# Patient Record
Sex: FEMALE | Race: WHITE | Employment: OTHER | URBAN - METROPOLITAN AREA
[De-identification: names, ages, dates, MRNs, and addresses within clinical notes are randomized per-mention and may not be internally consistent; named-entity substitution may affect disease eponyms.]

---

## 2017-04-10 PROBLEM — E66.9 MILD OBESITY: Status: ACTIVE | Noted: 2017-04-10

## 2018-02-28 PROBLEM — I71.40 ANEURYSM OF ABDOMINAL AORTA: Status: ACTIVE | Noted: 2018-02-28

## 2018-06-06 ENCOUNTER — OFFICE VISIT (OUTPATIENT)
Dept: CARDIOLOGY | Facility: CLINIC | Age: 78
End: 2018-06-06
Attending: INTERNAL MEDICINE
Payer: MEDICARE

## 2018-06-06 VITALS
HEART RATE: 82 BPM | BODY MASS INDEX: 32.44 KG/M2 | DIASTOLIC BLOOD PRESSURE: 71 MMHG | HEIGHT: 69 IN | SYSTOLIC BLOOD PRESSURE: 130 MMHG | WEIGHT: 219 LBS

## 2018-06-06 DIAGNOSIS — I10 ESSENTIAL HYPERTENSION: ICD-10-CM

## 2018-06-06 DIAGNOSIS — E66.9 MILD OBESITY: ICD-10-CM

## 2018-06-06 DIAGNOSIS — E78.00 HYPERCHOLESTEROLEMIA: ICD-10-CM

## 2018-06-06 DIAGNOSIS — K21.9 GASTROESOPHAGEAL REFLUX DISEASE WITHOUT ESOPHAGITIS: ICD-10-CM

## 2018-06-06 DIAGNOSIS — R07.2 PRECORDIAL PAIN: ICD-10-CM

## 2018-06-06 DIAGNOSIS — I71.40 ABDOMINAL AORTIC ANEURYSM (AAA) WITHOUT RUPTURE: ICD-10-CM

## 2018-06-06 DIAGNOSIS — I50.32 HEART FAILURE, DIASTOLIC, CHRONIC: ICD-10-CM

## 2018-06-06 PROCEDURE — 99214 OFFICE O/P EST MOD 30 MIN: CPT | Mod: S$GLB,,, | Performed by: INTERNAL MEDICINE

## 2018-06-06 PROCEDURE — 3078F DIAST BP <80 MM HG: CPT | Mod: CPTII,S$GLB,, | Performed by: INTERNAL MEDICINE

## 2018-06-06 PROCEDURE — 3075F SYST BP GE 130 - 139MM HG: CPT | Mod: CPTII,S$GLB,, | Performed by: INTERNAL MEDICINE

## 2018-06-06 RX ORDER — LISINOPRIL 10 MG/1
10 TABLET ORAL DAILY
Qty: 90 TABLET | Refills: 3 | Status: SHIPPED | OUTPATIENT
Start: 2018-06-06 | End: 2018-12-05 | Stop reason: SDUPTHER

## 2018-06-06 RX ORDER — ASPIRIN 81 MG/1
81 TABLET ORAL DAILY
Qty: 90 TABLET | Refills: 3 | COMMUNITY
Start: 2018-06-06 | End: 2018-12-05 | Stop reason: SDUPTHER

## 2018-06-06 RX ORDER — HYDROCHLOROTHIAZIDE 12.5 MG/1
12.5 CAPSULE ORAL DAILY
Qty: 90 CAPSULE | Refills: 3 | Status: SHIPPED | OUTPATIENT
Start: 2018-06-06 | End: 2018-12-05 | Stop reason: SDUPTHER

## 2018-06-06 RX ORDER — SPIRONOLACTONE 25 MG/1
25 TABLET ORAL DAILY
Qty: 90 TABLET | Refills: 3 | Status: SHIPPED | OUTPATIENT
Start: 2018-06-06 | End: 2018-12-05 | Stop reason: SDUPTHER

## 2018-06-06 NOTE — PROGRESS NOTES
Subjective:     Shanice Grewal is a 78 y.o. female with hypertension and hypercholesterolemia. She is mildly obese. She has hypertensive heart disease with moderate diastolic dysfunction. She had an episode of chest pain when shopping on 11/14/2013. An ambulance was called for and she was checked at her home. Advised to follow up with her doctor as outpatient. She had a Stress Echo in 11/2013 that was fine. She was diagnosed with esophageal reflux disease in 4/2014. After she began a proton pump inhibitor and sucralfate her epigastric pain resolved. In 5/2015 she was seen at [facility] ER for atypical chest pain. She underwent a Stress Echocardiogram in 6/2015 during which she was able to exercise for 6:00 minutes. There was no chest pain and the ECG was negative. The Echo was negative as well except for a mildly sigmoid septum. The chest pain has since essentially resolved. In 5/2018 she had gynecological surgery. No exertional chest pain or exertional dyspnea. No palpitations or weak spells. No bleeding. Feeling well overall.    Congestive Heart Failure   Presents for follow-up visit. The disease course has been stable. Pertinent negatives include no abdominal pain, chest pain, chest pressure, claudication, edema, fatigue, muscle weakness, near-syncope, nocturia, orthopnea, palpitations, paroxysmal nocturnal dyspnea, shortness of breath or unexpected weight change. The symptoms have been stable.   Chest Pain    This is a chronic problem. The current episode started more than 1 year ago. The problem occurs rarely. The problem has been unchanged. Pertinent negatives include no abdominal pain, back pain, claudication, cough, diaphoresis, dizziness, exertional chest pressure, fever, headaches, hemoptysis, irregular heartbeat, leg pain, lower extremity edema, malaise/fatigue, nausea, near-syncope, numbness, orthopnea, palpitations, PND, shortness of breath, sputum production, syncope, vomiting or weakness. The pain is  aggravated by nothing.   Her past medical history is significant for CHF, hyperlipidemia and hypertension.   Pertinent negatives for past medical history include no diabetes and no muscle weakness.   Hypertension   This is a chronic problem. The current episode started more than 1 year ago. The problem is unchanged. The problem is controlled (usually 120-130/70-80 mmHg at home). Pertinent negatives include no anxiety, blurred vision, chest pain, headaches, malaise/fatigue, neck pain, orthopnea, palpitations, peripheral edema, PND, shortness of breath or sweats. There is no history of chronic renal disease.   Hyperlipidemia   This is a chronic problem. The current episode started more than 1 year ago. The problem is controlled. Recent lipid tests were reviewed and are normal. Exacerbating diseases include obesity. She has no history of chronic renal disease, diabetes, hypothyroidism, liver disease or nephrotic syndrome. Pertinent negatives include no chest pain, focal sensory loss, focal weakness, leg pain, myalgias or shortness of breath.       Review of Systems   Constitution: Negative for diaphoresis, fatigue, fever, weakness, malaise/fatigue and unexpected weight change.   HENT: Negative for nosebleeds.    Eyes: Negative for blurred vision, pain, vision loss in left eye and vision loss in right eye.   Cardiovascular: Negative for chest pain, claudication, dyspnea on exertion, irregular heartbeat, leg swelling, near-syncope, orthopnea, palpitations, paroxysmal nocturnal dyspnea and syncope.   Respiratory: Negative for cough, hemoptysis, shortness of breath, sleep disturbances due to breathing, sputum production and wheezing.    Endocrine: Negative for cold intolerance and heat intolerance.   Hematologic/Lymphatic: Negative for bleeding problem. Does not bruise/bleed easily.   Skin: Negative for color change and rash.   Musculoskeletal: Positive for arthritis. Negative for back pain, falls, joint pain, muscle  weakness, myalgias and neck pain.   Gastrointestinal: Negative for abdominal pain, heartburn, hematemesis, hematochezia, hemorrhoids, jaundice, melena, nausea and vomiting.   Genitourinary: Negative for dysuria, hematuria and nocturia.   Neurological: Negative for dizziness, focal weakness, headaches, light-headedness, loss of balance, numbness and vertigo.   Psychiatric/Behavioral: Negative for altered mental status, depression and memory loss. The patient is not nervous/anxious.    Allergic/Immunologic: Negative for hives and persistent infections.       Current Outpatient Prescriptions on File Prior to Visit   Medication Sig Dispense Refill    aspirin (ECOTRIN) 81 MG EC tablet Take 1 tablet (81 mg total) by mouth once daily. 90 tablet 3    atorvastatin (LIPITOR) 80 MG tablet Take 1 tablet (80 mg total) by mouth once daily. 90 tablet 3    ergocalciferol (VITAMIN D2) 50,000 unit Cap Take 50,000 Units by mouth every 7 days.      ezetimibe (ZETIA) 10 mg tablet Take 1 tablet (10 mg total) by mouth once daily. 90 tablet 3    fish oil-omega-3 fatty acids 300-1,000 mg capsule Take 2 g by mouth once daily.      fluticasone (FLONASE) 50 mcg/actuation nasal spray       FLUVIRIN [id number] 45 mcg (15 mcg x 3)/0.5 mL Susp       gabapentin (NEURONTIN) 100 MG capsule Take 100 mg by mouth 3 (three) times daily.      hydroCHLOROthiazide (MICROZIDE) 12.5 mg capsule Take 1 capsule (12.5 mg total) by mouth once daily. 90 capsule 3    lisinopril 10 MG tablet Take 1 tablet (10 mg total) by mouth once daily. 90 tablet 3    metoclopramide HCl (REGLAN) 10 MG tablet       omeprazole (PRILOSEC) 20 MG capsule Take 20 mg by mouth once daily.      PATANOL 0.1 % ophthalmic solution       spironolactone (ALDACTONE) 25 MG tablet Take 1 tablet (25 mg total) by mouth once daily. 90 tablet 3    sucralfate (CARAFATE) 1 gram tablet Take 1 g by mouth 4 (four) times daily before meals and nightly.        No current facility-administered  "medications on file prior to visit.        /71   Pulse 82   Ht 5' 9" (1.753 m)   Wt 99.3 kg (219 lb)   BMI 32.34 kg/m²       Objective:     Physical Exam   Constitutional: She is oriented to person, place, and time. She appears well-developed and well-nourished. She does not appear ill. No distress.   HENT:   Head: Normocephalic and atraumatic.   Nose: Nose normal.   Eyes: Right eye exhibits no discharge. Left eye exhibits no discharge. Right conjunctiva has no hemorrhage. Left conjunctiva has no hemorrhage. Right pupil is round. Left pupil is round. Pupils are equal.   Neck: Neck supple. No JVD present. Carotid bruit is not present. No thyromegaly present.   Cardiovascular: Normal rate, regular rhythm, S1 normal and S2 normal.   No extrasystoles are present. PMI is not displaced.  Exam reveals gallop and S4. Exam reveals no S3.    Murmur heard.   Harsh midsystolic murmur is present with a grade of 2/6  at the upper right sternal border  Pulses:       Radial pulses are 2+ on the right side, and 2+ on the left side.        Dorsalis pedis pulses are 2+ on the right side, and 2+ on the left side.        Posterior tibial pulses are 2+ on the right side, and 2+ on the left side.   Pulmonary/Chest: Effort normal and breath sounds normal.   Abdominal: Soft. Normal appearance. There is no hepatosplenomegaly. There is no tenderness.   Musculoskeletal:        Right ankle: She exhibits no swelling, no ecchymosis and no deformity.        Left ankle: She exhibits no swelling, no ecchymosis and no deformity.   Lymphadenopathy:        Head (right side): No submandibular adenopathy present.        Head (left side): No submandibular adenopathy present.     She has no cervical adenopathy.   Neurological: She is alert and oriented to person, place, and time. She is not disoriented. No cranial nerve deficit.   Skin: Skin is warm, dry and intact. No rash noted. She is not diaphoretic. Nails show no clubbing.   Psychiatric: She " has a normal mood and affect. Her speech is normal and behavior is normal. Judgment and thought content normal. Cognition and memory are normal.       Assessment:      1. Heart failure, diastolic, chronic    2. Precordial pain    3. Abdominal aortic aneurysm (AAA) without rupture    4. Essential hypertension    5. Hypercholesterolemia    6. Mild obesity    7. Gastroesophageal reflux disease without esophagitis        Plan:     1. Heart Failure, Diastolic, Chronic   2/21/2011: Echo: Moderate left ventricular hypertrophy. Moderate diastolic dysfunction. Mild to moderate mitral regurgitation.   On lisinopril 10 mg Q24, spironolactone 25 mg Q24 and hctz 12.5 mg Q24.   Well compensated.    2. Chest Pain   11/15/2013: ECG: NSR. Left axis deviation.   11/25/2013: Stress Echo: 6:30 min. Negative.   5/19/2015: Seen at [facility] ER for atypical chest pain.   6/2/2015: Stress Echo: 6:00 min. No CP. ECG negative. Echo negative. Mildly sigmoid septum.    3/15/2016: Recurrent atypical chest pain.   Appears chest pain was non cardiac in origin.   Mostly resolved.   Reassurance.     3. Hypertension   2000: Diagnosed.   On lisinopril 10 mg Q24, spironolactone 25 mg Q24 and hctz 12.5 mg Q24.   Keeping log at home.    Well controlled.     4. Hypercholesterolemia   2005: Began statin.   On atorvastatin 80 mg Q24 and ezetimide 10 mg Q24.   Tells me well controlled.    5. Mild Obesity   4/10/2017: Weight 98 kg. BMI 32.   Encouraged to lose weight.    6. Gastroesophageal Reflux Disease    4/2014: Diagnosed.   On omeprazole 20 mg Q24.   Seldom bothers her.    7. Primary Care   Dr. Doe Bingham.    F/u 6 months.    Mira Mcknight M.D.

## 2018-12-05 ENCOUNTER — OFFICE VISIT (OUTPATIENT)
Dept: CARDIOLOGY | Facility: CLINIC | Age: 78
End: 2018-12-05
Attending: INTERNAL MEDICINE
Payer: MEDICARE

## 2018-12-05 VITALS
HEART RATE: 75 BPM | BODY MASS INDEX: 32.44 KG/M2 | DIASTOLIC BLOOD PRESSURE: 72 MMHG | WEIGHT: 219 LBS | SYSTOLIC BLOOD PRESSURE: 131 MMHG | HEIGHT: 69 IN

## 2018-12-05 DIAGNOSIS — E66.9 MILD OBESITY: ICD-10-CM

## 2018-12-05 DIAGNOSIS — I10 ESSENTIAL HYPERTENSION: ICD-10-CM

## 2018-12-05 DIAGNOSIS — I50.32 HEART FAILURE, DIASTOLIC, CHRONIC: ICD-10-CM

## 2018-12-05 DIAGNOSIS — I71.40 ABDOMINAL AORTIC ANEURYSM (AAA) WITHOUT RUPTURE: ICD-10-CM

## 2018-12-05 DIAGNOSIS — Z87.898 HISTORY OF CHEST PAIN: ICD-10-CM

## 2018-12-05 DIAGNOSIS — K21.9 GASTROESOPHAGEAL REFLUX DISEASE WITHOUT ESOPHAGITIS: ICD-10-CM

## 2018-12-05 DIAGNOSIS — R07.2 PRECORDIAL PAIN: ICD-10-CM

## 2018-12-05 DIAGNOSIS — E78.00 HYPERCHOLESTEROLEMIA: ICD-10-CM

## 2018-12-05 PROCEDURE — 3078F DIAST BP <80 MM HG: CPT | Mod: CPTII,S$GLB,, | Performed by: INTERNAL MEDICINE

## 2018-12-05 PROCEDURE — 99214 OFFICE O/P EST MOD 30 MIN: CPT | Mod: S$GLB,,, | Performed by: INTERNAL MEDICINE

## 2018-12-05 PROCEDURE — 3075F SYST BP GE 130 - 139MM HG: CPT | Mod: CPTII,S$GLB,, | Performed by: INTERNAL MEDICINE

## 2018-12-05 PROCEDURE — 93000 ELECTROCARDIOGRAM COMPLETE: CPT | Mod: S$GLB,,, | Performed by: INTERNAL MEDICINE

## 2018-12-05 RX ORDER — SPIRONOLACTONE 25 MG/1
25 TABLET ORAL DAILY
Qty: 90 TABLET | Refills: 3 | Status: SHIPPED | OUTPATIENT
Start: 2018-12-05 | End: 2019-06-01

## 2018-12-05 RX ORDER — HYDROCHLOROTHIAZIDE 12.5 MG/1
12.5 CAPSULE ORAL DAILY
Qty: 90 CAPSULE | Refills: 3 | Status: SHIPPED | OUTPATIENT
Start: 2018-12-05 | End: 2019-05-31

## 2018-12-05 RX ORDER — ATORVASTATIN CALCIUM 80 MG/1
80 TABLET, FILM COATED ORAL DAILY
Qty: 90 TABLET | Refills: 3 | Status: SHIPPED | OUTPATIENT
Start: 2018-12-05 | End: 2019-06-05 | Stop reason: SDUPTHER

## 2018-12-05 RX ORDER — LISINOPRIL 10 MG/1
10 TABLET ORAL DAILY
Qty: 90 TABLET | Refills: 3 | Status: SHIPPED | OUTPATIENT
Start: 2018-12-05 | End: 2019-06-05 | Stop reason: SDUPTHER

## 2018-12-05 RX ORDER — EZETIMIBE 10 MG/1
10 TABLET ORAL DAILY
Qty: 90 TABLET | Refills: 3 | Status: SHIPPED | OUTPATIENT
Start: 2018-12-05 | End: 2019-06-05 | Stop reason: SDUPTHER

## 2018-12-05 RX ORDER — ASPIRIN 81 MG/1
81 TABLET ORAL DAILY
Qty: 90 TABLET | Refills: 3 | COMMUNITY
Start: 2018-12-05 | End: 2019-06-05 | Stop reason: SDUPTHER

## 2018-12-05 NOTE — PROGRESS NOTES
Subjective:     Shanice Grewal is a 78 y.o. female with hypertension and hypercholesterolemia. She is mildly obese. She has hypertensive heart disease with moderate diastolic dysfunction. She had an episode of chest pain when shopping on 11/14/2013. An ambulance was called for and she was checked at her home. Advised to follow up with her doctor as outpatient. She had a Stress Echo in 11/2013 that was fine. She was diagnosed with esophageal reflux disease in 4/2014. After she began a proton pump inhibitor and sucralfate her epigastric pain resolved. In 5/2015 she was seen at [facility] ER for atypical chest pain. She underwent a Stress Echocardiogram in 6/2015 during which she was able to exercise for 6:00 minutes. There was no chest pain and the ECG was negative. The Echo was negative as well except for a mildly sigmoid septum. The chest pain has since resolved. In 5/2018 she had gynecological surgery. No exertional chest pain or exertional dyspnea. No palpitations or weak spells. Occasional a warm feeling in the right calf that can come on at essentially any time and dose not appear related to activities.  No bleeding. Feeling well overall.      Congestive Heart Failure   Presents for follow-up visit. The disease course has been stable. Pertinent negatives include no abdominal pain, chest pain, chest pressure, claudication, edema, fatigue, muscle weakness, near-syncope, nocturia, orthopnea, palpitations, paroxysmal nocturnal dyspnea, shortness of breath or unexpected weight change. The symptoms have been stable.   Chest Pain    This is a chronic problem. The current episode started more than 1 year ago. The problem has been resolved. Pertinent negatives include no abdominal pain, back pain, claudication, cough, diaphoresis, dizziness, exertional chest pressure, fever, headaches, hemoptysis, irregular heartbeat, leg pain, lower extremity edema, malaise/fatigue, nausea, near-syncope, numbness, orthopnea, palpitations,  PND, shortness of breath, sputum production, syncope, vomiting or weakness. The pain is aggravated by nothing.   Her past medical history is significant for CHF, hyperlipidemia and hypertension.   Pertinent negatives for past medical history include no diabetes and no muscle weakness.   Hypertension   This is a chronic problem. The current episode started more than 1 year ago. The problem is unchanged. The problem is controlled (usually 120-130/70-80 mmHg at home). Pertinent negatives include no anxiety, blurred vision, chest pain, headaches, malaise/fatigue, neck pain, orthopnea, palpitations, peripheral edema, PND, shortness of breath or sweats. There is no history of chronic renal disease.   Hyperlipidemia   This is a chronic problem. The current episode started more than 1 year ago. The problem is controlled. Recent lipid tests were reviewed and are normal. Exacerbating diseases include obesity. She has no history of chronic renal disease, diabetes, hypothyroidism, liver disease or nephrotic syndrome. Pertinent negatives include no chest pain, focal sensory loss, focal weakness, leg pain, myalgias or shortness of breath.       Review of Systems   Constitution: Negative for diaphoresis, fatigue, fever, weakness, malaise/fatigue and unexpected weight change.   HENT: Negative for nosebleeds.    Eyes: Negative for blurred vision, pain, vision loss in left eye and vision loss in right eye.   Cardiovascular: Negative for chest pain, claudication, dyspnea on exertion, irregular heartbeat, leg swelling, near-syncope, orthopnea, palpitations, paroxysmal nocturnal dyspnea and syncope.   Respiratory: Negative for cough, hemoptysis, shortness of breath, sleep disturbances due to breathing, sputum production and wheezing.    Endocrine: Negative for cold intolerance and heat intolerance.   Hematologic/Lymphatic: Negative for bleeding problem. Does not bruise/bleed easily.   Skin: Negative for color change and rash.    Musculoskeletal: Positive for arthritis. Negative for back pain, falls, joint pain, muscle weakness, myalgias and neck pain.   Gastrointestinal: Negative for abdominal pain, heartburn, hematemesis, hematochezia, hemorrhoids, jaundice, melena, nausea and vomiting.   Genitourinary: Negative for dysuria, hematuria and nocturia.   Neurological: Negative for dizziness, focal weakness, headaches, light-headedness, loss of balance, numbness, tremors and vertigo.   Psychiatric/Behavioral: Negative for altered mental status, depression and memory loss. The patient is not nervous/anxious.    Allergic/Immunologic: Negative for hives and persistent infections.       Current Outpatient Medications on File Prior to Visit   Medication Sig Dispense Refill    aspirin (ECOTRIN) 81 MG EC tablet Take 1 tablet (81 mg total) by mouth once daily. 90 tablet 3    atorvastatin (LIPITOR) 80 MG tablet Take 1 tablet (80 mg total) by mouth once daily. 90 tablet 3    ergocalciferol (VITAMIN D2) 50,000 unit Cap Take 50,000 Units by mouth every 7 days.      ezetimibe (ZETIA) 10 mg tablet Take 1 tablet (10 mg total) by mouth once daily. 90 tablet 3    fish oil-omega-3 fatty acids 300-1,000 mg capsule Take 2 g by mouth once daily.      fluticasone (FLONASE) 50 mcg/actuation nasal spray       FLUVIRIN [id number] 45 mcg (15 mcg x 3)/0.5 mL Susp       gabapentin (NEURONTIN) 100 MG capsule Take 100 mg by mouth 3 (three) times daily.      hydroCHLOROthiazide (MICROZIDE) 12.5 mg capsule Take 1 capsule (12.5 mg total) by mouth once daily. 90 capsule 3    lisinopril 10 MG tablet Take 1 tablet (10 mg total) by mouth once daily. 90 tablet 3    metoclopramide HCl (REGLAN) 10 MG tablet       omeprazole (PRILOSEC) 20 MG capsule Take 20 mg by mouth once daily.      PATANOL 0.1 % ophthalmic solution       spironolactone (ALDACTONE) 25 MG tablet Take 1 tablet (25 mg total) by mouth once daily. 90 tablet 3    sucralfate (CARAFATE) 1 gram tablet Take 1  "g by mouth 4 (four) times daily before meals and nightly.        No current facility-administered medications on file prior to visit.        /72   Pulse 75   Ht 5' 9" (1.753 m)   Wt 99.3 kg (219 lb)   BMI 32.34 kg/m²       Objective:     Physical Exam   Constitutional: She is oriented to person, place, and time. She appears well-developed and well-nourished. She does not appear ill. No distress.   HENT:   Head: Normocephalic and atraumatic.   Nose: Nose normal.   Eyes: Right eye exhibits no discharge. Left eye exhibits no discharge. Right conjunctiva has no hemorrhage. Left conjunctiva has no hemorrhage. Right pupil is round. Left pupil is round. Pupils are equal.   Neck: Neck supple. No JVD present. Carotid bruit is not present. No thyromegaly present.   Cardiovascular: Normal rate, regular rhythm, S1 normal and S2 normal.  No extrasystoles are present. PMI is not displaced. Exam reveals gallop and S4. Exam reveals no S3.   Murmur heard.   Harsh midsystolic murmur is present with a grade of 2/6 at the upper right sternal border.  Pulses:       Radial pulses are 2+ on the right side, and 2+ on the left side.        Dorsalis pedis pulses are 1+ on the right side, and 1+ on the left side.        Posterior tibial pulses are 1+ on the right side, and 1+ on the left side.   Pulmonary/Chest: Effort normal and breath sounds normal.   Abdominal: Soft. Normal appearance. There is no hepatosplenomegaly. There is no tenderness.   Musculoskeletal:        Right ankle: She exhibits no swelling, no ecchymosis and no deformity.        Left ankle: She exhibits no swelling, no ecchymosis and no deformity.   Lymphadenopathy:        Head (right side): No submandibular adenopathy present.        Head (left side): No submandibular adenopathy present.     She has no cervical adenopathy.   Neurological: She is alert and oriented to person, place, and time. She is not disoriented. No cranial nerve deficit.   Skin: Skin is warm, " dry and intact. No rash noted. She is not diaphoretic.   Psychiatric: She has a normal mood and affect. Her speech is normal and behavior is normal. Judgment and thought content normal. Cognition and memory are normal.       Assessment:      1. Heart failure, diastolic, chronic    2. History of chest pain    3. Abdominal aortic aneurysm (AAA) without rupture    4. Essential hypertension    5. Hypercholesterolemia    6. Mild obesity    7. Gastroesophageal reflux disease without esophagitis        Plan:     1. Heart Failure, Diastolic, Chronic   2/21/2011: Echo: Moderate left ventricular hypertrophy. Moderate diastolic dysfunction. Mild to moderate mitral regurgitation.   On lisinopril 10 mg Q24, spironolactone 25 mg Q24 and hctz 12.5 mg Q24.   Well compensated.    2. History of Chest Pain   11/15/2013: ECG: NSR. Left axis deviation.   11/25/2013: Stress Echo: 6:30 min. Negative.   5/19/2015: Seen at [facility] ER for atypical chest pain.   6/2/2015: Stress Echo: 6:00 min. No CP. ECG negative. Echo negative. Mildly sigmoid septum.    3/15/2016: Recurrent atypical chest pain.   Appeared chest pain was non cardiac in origin.   Mostly resolved.   Reassurance.    3. Abdominal Aortic Aneurysm   1/24/2018: CT abdomen: Small AAA 3.1 cm.   1/2019: Do U/S AAA. Then yearly.      4. Hypertension   2000: Diagnosed.   On lisinopril 10 mg Q24, spironolactone 25 mg Q24 and hctz 12.5 mg Q24.   Keeping log at home.    Well controlled.    5. Hypercholesterolemia   2005: Began statin.   On atorvastatin 80 mg Q24 and ezetimide 10 mg Q24.   Tells me well controlled.    6. Mild Obesity   4/10/2017: Weight 98 kg. BMI 32.   Encouraged to lose weight.    7. Gastroesophageal Reflux Disease    4/2014: Diagnosed.   On omeprazole 20 mg Q24.   Seldom bothers her.    8. Primary Care   Dr. Doe Bingham.    F/u 6 months.    Mira Mcknight M.D.        1/13/2019 7:42 PM, Addendum:    1/2/2019: U/S AAA: Small AAA 2.5 cm x 3.0 cm.    I discussed the above  test result and the implications of the findings over the phone.    Mira Mcknight M.D.

## 2019-01-02 ENCOUNTER — CLINICAL SUPPORT (OUTPATIENT)
Dept: CARDIOLOGY | Facility: CLINIC | Age: 79
End: 2019-01-02
Attending: INTERNAL MEDICINE
Payer: MEDICARE

## 2019-01-02 DIAGNOSIS — I71.40 ABDOMINAL AORTIC ANEURYSM (AAA) WITHOUT RUPTURE: ICD-10-CM

## 2019-01-02 PROCEDURE — 76705 PR US, ABDOMEN LIMITED: ICD-10-PCS | Mod: S$GLB,,, | Performed by: INTERNAL MEDICINE

## 2019-01-02 PROCEDURE — 76705 ECHO EXAM OF ABDOMEN: CPT | Mod: S$GLB,,, | Performed by: INTERNAL MEDICINE

## 2019-05-31 DIAGNOSIS — I10 ESSENTIAL HYPERTENSION: ICD-10-CM

## 2019-05-31 RX ORDER — HYDROCHLOROTHIAZIDE 12.5 MG/1
CAPSULE ORAL
Qty: 90 CAPSULE | Refills: 0 | Status: SHIPPED | OUTPATIENT
Start: 2019-05-31 | End: 2019-06-05 | Stop reason: SDUPTHER

## 2019-06-01 DIAGNOSIS — I10 ESSENTIAL HYPERTENSION: ICD-10-CM

## 2019-06-01 RX ORDER — SPIRONOLACTONE 25 MG/1
TABLET ORAL
Qty: 90 TABLET | Refills: 0 | Status: SHIPPED | OUTPATIENT
Start: 2019-06-01 | End: 2019-06-05 | Stop reason: SDUPTHER

## 2019-06-05 ENCOUNTER — OFFICE VISIT (OUTPATIENT)
Dept: CARDIOLOGY | Facility: CLINIC | Age: 79
End: 2019-06-05
Attending: INTERNAL MEDICINE
Payer: MEDICARE

## 2019-06-05 VITALS
HEART RATE: 67 BPM | WEIGHT: 220 LBS | DIASTOLIC BLOOD PRESSURE: 70 MMHG | SYSTOLIC BLOOD PRESSURE: 128 MMHG | BODY MASS INDEX: 32.58 KG/M2 | HEIGHT: 69 IN

## 2019-06-05 DIAGNOSIS — E78.00 HYPERCHOLESTEROLEMIA: ICD-10-CM

## 2019-06-05 DIAGNOSIS — I10 ESSENTIAL HYPERTENSION: ICD-10-CM

## 2019-06-05 DIAGNOSIS — K21.9 GASTROESOPHAGEAL REFLUX DISEASE WITHOUT ESOPHAGITIS: ICD-10-CM

## 2019-06-05 DIAGNOSIS — I50.32 HEART FAILURE, DIASTOLIC, CHRONIC: ICD-10-CM

## 2019-06-05 DIAGNOSIS — I71.40 ABDOMINAL AORTIC ANEURYSM (AAA) WITHOUT RUPTURE: ICD-10-CM

## 2019-06-05 DIAGNOSIS — Z87.898 HISTORY OF CHEST PAIN: ICD-10-CM

## 2019-06-05 DIAGNOSIS — E66.9 MILD OBESITY: ICD-10-CM

## 2019-06-05 PROCEDURE — 3078F DIAST BP <80 MM HG: CPT | Mod: CPTII,S$GLB,, | Performed by: INTERNAL MEDICINE

## 2019-06-05 PROCEDURE — 3074F PR MOST RECENT SYSTOLIC BLOOD PRESSURE < 130 MM HG: ICD-10-PCS | Mod: CPTII,S$GLB,, | Performed by: INTERNAL MEDICINE

## 2019-06-05 PROCEDURE — 3074F SYST BP LT 130 MM HG: CPT | Mod: CPTII,S$GLB,, | Performed by: INTERNAL MEDICINE

## 2019-06-05 PROCEDURE — 99214 PR OFFICE/OUTPT VISIT, EST, LEVL IV, 30-39 MIN: ICD-10-PCS | Mod: S$GLB,,, | Performed by: INTERNAL MEDICINE

## 2019-06-05 PROCEDURE — 99214 OFFICE O/P EST MOD 30 MIN: CPT | Mod: S$GLB,,, | Performed by: INTERNAL MEDICINE

## 2019-06-05 PROCEDURE — 3078F PR MOST RECENT DIASTOLIC BLOOD PRESSURE < 80 MM HG: ICD-10-PCS | Mod: CPTII,S$GLB,, | Performed by: INTERNAL MEDICINE

## 2019-06-05 RX ORDER — EZETIMIBE 10 MG/1
10 TABLET ORAL DAILY
Qty: 90 TABLET | Refills: 3 | Status: SHIPPED | OUTPATIENT
Start: 2019-06-05 | End: 2019-09-30 | Stop reason: SDUPTHER

## 2019-06-05 RX ORDER — ASPIRIN 81 MG/1
81 TABLET ORAL DAILY
Qty: 90 TABLET | Refills: 3 | COMMUNITY
Start: 2019-06-05 | End: 2019-09-30

## 2019-06-05 RX ORDER — SPIRONOLACTONE 25 MG/1
25 TABLET ORAL DAILY
Qty: 90 TABLET | Refills: 3 | Status: SHIPPED | OUTPATIENT
Start: 2019-06-05 | End: 2019-09-30 | Stop reason: SDUPTHER

## 2019-06-05 RX ORDER — ATORVASTATIN CALCIUM 80 MG/1
80 TABLET, FILM COATED ORAL DAILY
Qty: 90 TABLET | Refills: 3 | Status: SHIPPED | OUTPATIENT
Start: 2019-06-05 | End: 2019-09-30 | Stop reason: SDUPTHER

## 2019-06-05 RX ORDER — LISINOPRIL 10 MG/1
10 TABLET ORAL DAILY
Qty: 90 TABLET | Refills: 3 | Status: SHIPPED | OUTPATIENT
Start: 2019-06-05 | End: 2019-09-30

## 2019-06-05 RX ORDER — HYDROCHLOROTHIAZIDE 12.5 MG/1
12.5 CAPSULE ORAL DAILY
Qty: 90 CAPSULE | Refills: 3 | Status: SHIPPED | OUTPATIENT
Start: 2019-06-05 | End: 2019-09-30 | Stop reason: SDUPTHER

## 2019-06-05 NOTE — PROGRESS NOTES
Subjective:     Shanice Grewal is a 79 y.o. female with hypertension and hypercholesterolemia. In 2019 she was told she may have impaired fasting glucose. She is mildly obese. She has hypertensive heart disease with moderate diastolic dysfunction. She had an episode of chest pain when shopping on 11/14/2013. An ambulance was called for and she was checked at her home. Advised to follow up with her doctor as outpatient. She had a Stress Echo in 11/2013 that was fine. She was diagnosed with esophageal reflux disease in 4/2014. After she began a proton pump inhibitor and sucralfate her epigastric pain resolved. In 5/2015 she was seen at [facility] ER for atypical chest pain. She underwent a Stress Echocardiogram in 6/2015 during which she was able to exercise for 6:00 minutes. There was no chest pain and the ECG was negative. The Echo was negative as well except for a mildly sigmoid septum. The chest pain has since resolved. In 5/2018 she had gynecological surgery. No exertional chest pain or exertional dyspnea. No palpitations or weak spells. Occasional a warm feeling in the right calf that can come on at essentially any time and dose not appear related to activities. No bleeding. Feeling well overall.      Congestive Heart Failure   Presents for follow-up visit. The disease course has been stable. Pertinent negatives include no abdominal pain, chest pain, chest pressure, claudication, edema, fatigue, muscle weakness, near-syncope, nocturia, orthopnea, palpitations, paroxysmal nocturnal dyspnea, shortness of breath or unexpected weight change. The symptoms have been stable.   Chest Pain    This is a chronic problem. The current episode started more than 1 year ago. The problem has been resolved. Pertinent negatives include no abdominal pain, back pain, claudication, cough, diaphoresis, dizziness, exertional chest pressure, fever, headaches, hemoptysis, irregular heartbeat, leg pain, lower extremity edema, malaise/fatigue,  nausea, near-syncope, numbness, orthopnea, palpitations, PND, shortness of breath, sputum production, syncope, vomiting or weakness. The pain is aggravated by nothing.   Her past medical history is significant for CHF, hyperlipidemia and hypertension.   Pertinent negatives for past medical history include no diabetes and no muscle weakness.   Hypertension   This is a chronic problem. The current episode started more than 1 year ago. The problem is unchanged. The problem is controlled (usually 120-130/70-80 mmHg at home). Pertinent negatives include no anxiety, blurred vision, chest pain, headaches, malaise/fatigue, neck pain, orthopnea, palpitations, peripheral edema, PND, shortness of breath or sweats. There is no history of chronic renal disease.   Hyperlipidemia   This is a chronic problem. The current episode started more than 1 year ago. The problem is controlled. Recent lipid tests were reviewed and are normal. Exacerbating diseases include obesity. She has no history of chronic renal disease, diabetes, hypothyroidism, liver disease or nephrotic syndrome. Pertinent negatives include no chest pain, focal sensory loss, focal weakness, leg pain, myalgias or shortness of breath.       Review of Systems   Constitution: Negative for diaphoresis, fatigue, fever, malaise/fatigue and unexpected weight change.   HENT: Negative for nosebleeds.    Eyes: Negative for blurred vision, pain, vision loss in left eye and vision loss in right eye.   Cardiovascular: Negative for chest pain, claudication, dyspnea on exertion, irregular heartbeat, leg swelling, near-syncope, orthopnea, palpitations, paroxysmal nocturnal dyspnea and syncope.   Respiratory: Negative for cough, hemoptysis, shortness of breath, sleep disturbances due to breathing, sputum production and wheezing.    Endocrine: Negative for cold intolerance and heat intolerance.   Hematologic/Lymphatic: Negative for bleeding problem. Does not bruise/bleed easily.   Skin:  Negative for color change and rash.   Musculoskeletal: Positive for arthritis. Negative for back pain, falls, joint pain, muscle weakness, myalgias and neck pain.   Gastrointestinal: Negative for abdominal pain, heartburn, hematemesis, hematochezia, hemorrhoids, jaundice, melena, nausea and vomiting.   Genitourinary: Negative for dysuria, hematuria and nocturia.   Neurological: Negative for dizziness, focal weakness, headaches, light-headedness, loss of balance, numbness, tremors, vertigo and weakness.   Psychiatric/Behavioral: Negative for altered mental status, depression and memory loss. The patient is not nervous/anxious.    Allergic/Immunologic: Negative for hives and persistent infections.       Current Outpatient Medications on File Prior to Visit   Medication Sig Dispense Refill    aspirin (ECOTRIN) 81 MG EC tablet Take 1 tablet (81 mg total) by mouth once daily. 90 tablet 3    atorvastatin (LIPITOR) 80 MG tablet Take 1 tablet (80 mg total) by mouth once daily. 90 tablet 3    ergocalciferol (VITAMIN D2) 50,000 unit Cap Take 50,000 Units by mouth every 7 days.      ezetimibe (ZETIA) 10 mg tablet Take 1 tablet (10 mg total) by mouth once daily. 90 tablet 3    fish oil-omega-3 fatty acids 300-1,000 mg capsule Take 2 g by mouth once daily.      fluticasone (FLONASE) 50 mcg/actuation nasal spray       FLUVIRIN 9900-2006 45 mcg (15 mcg x 3)/0.5 mL Susp       gabapentin (NEURONTIN) 100 MG capsule Take 100 mg by mouth 3 (three) times daily.      hydroCHLOROthiazide (MICROZIDE) 12.5 mg capsule TAKE 1 CAPSULE(12.5 MG) BY MOUTH EVERY DAY 90 capsule 0    lisinopril 10 MG tablet Take 1 tablet (10 mg total) by mouth once daily. 90 tablet 3    metoclopramide HCl (REGLAN) 10 MG tablet       omeprazole (PRILOSEC) 20 MG capsule Take 20 mg by mouth once daily.      PATANOL 0.1 % ophthalmic solution       spironolactone (ALDACTONE) 25 MG tablet TAKE 1 TABLET(25 MG) BY MOUTH EVERY DAY 90 tablet 0    sucralfate  "(CARAFATE) 1 gram tablet Take 1 g by mouth 4 (four) times daily before meals and nightly.        No current facility-administered medications on file prior to visit.        /70   Pulse 67   Ht 5' 9" (1.753 m)   Wt 99.8 kg (220 lb)   BMI 32.49 kg/m²       Objective:     Physical Exam   Constitutional: She is oriented to person, place, and time. She appears well-developed and well-nourished. She does not appear ill. No distress.   HENT:   Head: Normocephalic and atraumatic.   Nose: Nose normal.   Eyes: Right eye exhibits no discharge. Left eye exhibits no discharge. Right conjunctiva has no hemorrhage. Left conjunctiva has no hemorrhage. Right pupil is round. Left pupil is round. Pupils are equal.   Neck: Neck supple. No JVD present. Carotid bruit is not present. No thyromegaly present.   Cardiovascular: Normal rate, regular rhythm, S1 normal and S2 normal.  No extrasystoles are present. PMI is not displaced. Exam reveals gallop and S4. Exam reveals no S3.   Murmur heard.   Harsh midsystolic murmur is present with a grade of 2/6 at the upper right sternal border.  Pulses:       Radial pulses are 2+ on the right side, and 2+ on the left side.        Dorsalis pedis pulses are 1+ on the right side, and 1+ on the left side.        Posterior tibial pulses are 1+ on the right side, and 1+ on the left side.   Pulmonary/Chest: Effort normal and breath sounds normal.   Abdominal: Soft. Normal appearance. There is no hepatosplenomegaly. There is no tenderness.   Musculoskeletal:        Right ankle: She exhibits no swelling, no ecchymosis and no deformity.        Left ankle: She exhibits no swelling, no ecchymosis and no deformity.   Lymphadenopathy:        Head (right side): No submandibular adenopathy present.        Head (left side): No submandibular adenopathy present.     She has no cervical adenopathy.   Neurological: She is alert and oriented to person, place, and time. She is not disoriented. No cranial nerve " deficit.   Skin: Skin is warm, dry and intact. No rash noted. She is not diaphoretic.   Psychiatric: She has a normal mood and affect. Her speech is normal and behavior is normal. Judgment and thought content normal. Cognition and memory are normal.       Assessment:      1. Heart failure, diastolic, chronic    2. History of chest pain    3. Abdominal aortic aneurysm (AAA) without rupture    4. Essential hypertension    5. Hypercholesterolemia    6. Mild obesity    7. Gastroesophageal reflux disease without esophagitis        Plan:     1. Heart Failure, Diastolic, Chronic   2/21/2011: Echo: Moderate left ventricular hypertrophy. Moderate diastolic dysfunction. Mild to moderate mitral regurgitation.   On lisinopril 10 mg Q24, spironolactone 25 mg Q24 and hctz 12.5 mg Q24.   Well compensated.    2. History of Chest Pain   11/15/2013: ECG: NSR. Left axis deviation.   11/25/2013: Stress Echo: 6:30 min. Negative.   5/19/2015: Seen at [facility] ER for atypical chest pain.   6/2/2015: Stress Echo: 6:00 min. No CP. ECG negative. Echo negative. Mildly sigmoid septum.    3/15/2016: Recurrent atypical chest pain.   Appeared chest pain was non cardiac in origin.   Mostly resolved.   Reassurance.    3. Abdominal Aortic Aneurysm   1/24/2018: CT abdomen: Small AAA 3.1 cm.   1/2/2019: U/S AAA: Small AAA 2.5 cm x 3.0 cm.   1/2020: Do U/S AAA. Then yearly.      4. Hypertension   2000: Diagnosed.   On lisinopril 10 mg Q24, spironolactone 25 mg Q24 and hctz 12.5 mg Q24.   Keeping log at home.    Well controlled.    5. Hypercholesterolemia   2005: Began statin.   On atorvastatin 80 mg Q24 and ezetimide 10 mg Q24.   Tells me well controlled.    6. Mild Obesity   4/10/2017: Weight 98 kg. BMI 32.   Encouraged to lose weight.    7. Gastroesophageal Reflux Disease    4/2014: Diagnosed.   On omeprazole 20 mg Q24.   Seldom bothers her.    8. Primary Care   Dr. Doe Bingham.    F/u 6 months.    Mira Mcknight M.D.

## 2019-08-13 DIAGNOSIS — I10 ESSENTIAL HYPERTENSION: ICD-10-CM

## 2019-08-13 RX ORDER — LISINOPRIL 10 MG/1
TABLET ORAL
Qty: 90 TABLET | Refills: 0 | Status: SHIPPED | OUTPATIENT
Start: 2019-08-13 | End: 2019-09-30

## 2019-09-30 ENCOUNTER — OFFICE VISIT (OUTPATIENT)
Dept: CARDIOLOGY | Facility: CLINIC | Age: 79
End: 2019-09-30
Attending: INTERNAL MEDICINE
Payer: MEDICARE

## 2019-09-30 VITALS
DIASTOLIC BLOOD PRESSURE: 60 MMHG | BODY MASS INDEX: 31.55 KG/M2 | SYSTOLIC BLOOD PRESSURE: 100 MMHG | HEART RATE: 65 BPM | WEIGHT: 213 LBS | HEIGHT: 69 IN

## 2019-09-30 DIAGNOSIS — Z79.01 CHRONIC ANTICOAGULATION: ICD-10-CM

## 2019-09-30 DIAGNOSIS — I50.32 HEART FAILURE, DIASTOLIC, CHRONIC: ICD-10-CM

## 2019-09-30 DIAGNOSIS — I48.0 PAROXYSMAL ATRIAL FIBRILLATION: ICD-10-CM

## 2019-09-30 DIAGNOSIS — I10 ESSENTIAL HYPERTENSION: ICD-10-CM

## 2019-09-30 DIAGNOSIS — I71.40 ABDOMINAL AORTIC ANEURYSM (AAA) WITHOUT RUPTURE: ICD-10-CM

## 2019-09-30 DIAGNOSIS — Z87.898 HISTORY OF CHEST PAIN: ICD-10-CM

## 2019-09-30 DIAGNOSIS — E78.00 HYPERCHOLESTEROLEMIA: ICD-10-CM

## 2019-09-30 DIAGNOSIS — E66.9 MILD OBESITY: ICD-10-CM

## 2019-09-30 PROBLEM — I48.91 ATRIAL FIBRILLATION: Status: ACTIVE | Noted: 2019-09-30

## 2019-09-30 PROCEDURE — 3078F DIAST BP <80 MM HG: CPT | Mod: CPTII,S$GLB,, | Performed by: INTERNAL MEDICINE

## 2019-09-30 PROCEDURE — 99215 OFFICE O/P EST HI 40 MIN: CPT | Mod: S$GLB,,, | Performed by: INTERNAL MEDICINE

## 2019-09-30 PROCEDURE — 3074F SYST BP LT 130 MM HG: CPT | Mod: CPTII,S$GLB,, | Performed by: INTERNAL MEDICINE

## 2019-09-30 PROCEDURE — 3074F PR MOST RECENT SYSTOLIC BLOOD PRESSURE < 130 MM HG: ICD-10-PCS | Mod: CPTII,S$GLB,, | Performed by: INTERNAL MEDICINE

## 2019-09-30 PROCEDURE — 99215 PR OFFICE/OUTPT VISIT, EST, LEVL V, 40-54 MIN: ICD-10-PCS | Mod: S$GLB,,, | Performed by: INTERNAL MEDICINE

## 2019-09-30 PROCEDURE — 3078F PR MOST RECENT DIASTOLIC BLOOD PRESSURE < 80 MM HG: ICD-10-PCS | Mod: CPTII,S$GLB,, | Performed by: INTERNAL MEDICINE

## 2019-09-30 RX ORDER — ATORVASTATIN CALCIUM 80 MG/1
80 TABLET, FILM COATED ORAL DAILY
Qty: 90 TABLET | Refills: 3 | Status: SHIPPED | OUTPATIENT
Start: 2019-09-30 | End: 2019-12-22

## 2019-09-30 RX ORDER — LISINOPRIL 10 MG/1
10 TABLET ORAL DAILY
Qty: 90 TABLET | Refills: 3 | Status: SHIPPED | OUTPATIENT
Start: 2019-09-30 | End: 2020-02-07

## 2019-09-30 RX ORDER — HYDROCHLOROTHIAZIDE 12.5 MG/1
12.5 CAPSULE ORAL DAILY
Qty: 90 CAPSULE | Refills: 3 | Status: SHIPPED | OUTPATIENT
Start: 2019-09-30 | End: 2020-06-11

## 2019-09-30 RX ORDER — EZETIMIBE 10 MG/1
10 TABLET ORAL DAILY
Qty: 90 TABLET | Refills: 3 | Status: SHIPPED | OUTPATIENT
Start: 2019-09-30 | End: 2020-06-22 | Stop reason: SDUPTHER

## 2019-09-30 RX ORDER — SPIRONOLACTONE 25 MG/1
25 TABLET ORAL DAILY
Qty: 90 TABLET | Refills: 3 | Status: SHIPPED | OUTPATIENT
Start: 2019-09-30 | End: 2020-06-22 | Stop reason: SDUPTHER

## 2019-09-30 NOTE — PROGRESS NOTES
Subjective:     Shanice Grewal is a 79 y.o. female with hypertension and hypercholesterolemia. In 2019 she was told she may have impaired fasting glucose. She is mildly obese. She has hypertensive heart disease with moderate diastolic dysfunction. She had an episode of chest pain when shopping on 11/14/2013. An ambulance was called for and she was checked at her home. Advised to follow up with her doctor as outpatient. She had a Stress Echo in 11/2013 that was fine. She was diagnosed with esophageal reflux disease in 4/2014. After she began a proton pump inhibitor and sucralfate her epigastric pain resolved. In 5/2015 she was seen at [facility] ER for atypical chest pain. She underwent a Stress Echocardiogram in 6/2015 during which she was able to exercise for 6:00 minutes. There was no chest pain and the ECG was negative. The Echo was negative as well except for a mildly sigmoid septum. The chest pain has since resolved. In 5/2018 she had gynecological surgery. On 9/9/2019 she had onset of palpitations and was diagnosed with atrial fibrillation. On 9/10/2019 she underwent Electrical Cardioversion. She has a [id number] score of 6 ([id number]) and was begun on apixiban. No exertional chest pain or exertional dyspnea. No palpitations or weak spells. Occasional a warm feeling in the right calf that can come on at essentially any time and dose not appear related to activities. No bleeding. Feeling well overall.      Congestive Heart Failure   Presents for follow-up visit. The disease course has been stable. Pertinent negatives include no abdominal pain, chest pain, chest pressure, claudication, edema, fatigue, muscle weakness, near-syncope, nocturia, orthopnea, palpitations, paroxysmal nocturnal dyspnea, shortness of breath or unexpected weight change. The symptoms have been stable.   Chest Pain    This is a chronic problem. The current episode started more than 1 year ago. The problem has been resolved. Pertinent negatives  include no abdominal pain, back pain, claudication, cough, diaphoresis, dizziness, exertional chest pressure, fever, headaches, hemoptysis, irregular heartbeat, leg pain, lower extremity edema, malaise/fatigue, nausea, near-syncope, numbness, orthopnea, palpitations, PND, shortness of breath, sputum production, syncope, vomiting or weakness. The pain is aggravated by nothing.   Her past medical history is significant for CHF, hyperlipidemia and hypertension.   Pertinent negatives for past medical history include no diabetes and no muscle weakness.   Hypertension   This is a chronic problem. The current episode started more than 1 year ago. The problem is unchanged. The problem is controlled (usually 120-130/70-80 mmHg at home). Pertinent negatives include no anxiety, blurred vision, chest pain, headaches, malaise/fatigue, neck pain, orthopnea, palpitations, peripheral edema, PND, shortness of breath or sweats. There is no history of chronic renal disease.   Hyperlipidemia   This is a chronic problem. The current episode started more than 1 year ago. The problem is controlled. Recent lipid tests were reviewed and are normal. Exacerbating diseases include obesity. She has no history of chronic renal disease, diabetes, hypothyroidism, liver disease or nephrotic syndrome. Pertinent negatives include no chest pain, focal sensory loss, focal weakness, leg pain, myalgias or shortness of breath.   Atrial Fibrillation   Presents for follow-up visit. Symptoms include hypertension. Symptoms are negative for bradycardia, chest pain, dizziness, hypotension, palpitations, shortness of breath, syncope, tachycardia and weakness. The symptoms have been stable. Past medical history includes atrial fibrillation, CHF and hyperlipidemia.       Review of Systems   Constitution: Negative for diaphoresis, fatigue, fever, malaise/fatigue and unexpected weight change.   HENT: Negative for nosebleeds.    Eyes: Negative for blurred vision,  pain, vision loss in left eye and vision loss in right eye.   Cardiovascular: Negative for chest pain, claudication, dyspnea on exertion, irregular heartbeat, leg swelling, near-syncope, orthopnea, palpitations, paroxysmal nocturnal dyspnea and syncope.   Respiratory: Negative for cough, hemoptysis, shortness of breath, sleep disturbances due to breathing, sputum production and wheezing.    Endocrine: Negative for cold intolerance and heat intolerance.   Hematologic/Lymphatic: Negative for bleeding problem. Does not bruise/bleed easily.   Skin: Negative for color change and rash.   Musculoskeletal: Positive for arthritis. Negative for back pain, falls, joint pain, muscle weakness, myalgias and neck pain.   Gastrointestinal: Negative for abdominal pain, heartburn, hematemesis, hematochezia, hemorrhoids, jaundice, melena, nausea and vomiting.   Genitourinary: Negative for dysuria, hematuria and nocturia.   Neurological: Negative for dizziness, focal weakness, headaches, light-headedness, loss of balance, numbness, tremors, vertigo and weakness.   Psychiatric/Behavioral: Negative for altered mental status, depression and memory loss. The patient is not nervous/anxious.    Allergic/Immunologic: Negative for hives and persistent infections.       Current Outpatient Medications on File Prior to Visit   Medication Sig Dispense Refill    atorvastatin (LIPITOR) 80 MG tablet Take 1 tablet (80 mg total) by mouth once daily. 90 tablet 3    ergocalciferol (VITAMIN D2) 50,000 unit Cap Take 50,000 Units by mouth every 7 days.      ezetimibe (ZETIA) 10 mg tablet Take 1 tablet (10 mg total) by mouth once daily. 90 tablet 3    fish oil-omega-3 fatty acids 300-1,000 mg capsule Take 2 g by mouth once daily.      fluticasone (FLONASE) 50 mcg/actuation nasal spray       FLUVIRIN 6344-8118 45 mcg (15 mcg x 3)/0.5 mL Susp       gabapentin (NEURONTIN) 100 MG capsule Take 100 mg by mouth 3 (three) times daily.       "hydroCHLOROthiazide (MICROZIDE) 12.5 mg capsule Take 1 capsule (12.5 mg total) by mouth once daily. 90 capsule 3    lisinopril 10 MG tablet Take 1 tablet (10 mg total) by mouth once daily. 90 tablet 3    lisinopril 10 MG tablet TAKE 1 TABLET(10 MG) BY MOUTH EVERY DAY 90 tablet 0    metoclopramide HCl (REGLAN) 10 MG tablet       omeprazole (PRILOSEC) 20 MG capsule Take 20 mg by mouth once daily.      PATANOL 0.1 % ophthalmic solution       spironolactone (ALDACTONE) 25 MG tablet Take 1 tablet (25 mg total) by mouth once daily. 90 tablet 3    sucralfate (CARAFATE) 1 gram tablet Take 1 g by mouth 4 (four) times daily before meals and nightly.       [DISCONTINUED] aspirin (ECOTRIN) 81 MG EC tablet Take 1 tablet (81 mg total) by mouth once daily. 90 tablet 3     No current facility-administered medications on file prior to visit.        /60   Pulse 65   Ht 5' 9" (1.753 m)   Wt 96.6 kg (213 lb)   BMI 31.45 kg/m²       Objective:     Physical Exam   Constitutional: She is oriented to person, place, and time. She appears well-developed and well-nourished. She does not appear ill. No distress.   HENT:   Head: Normocephalic and atraumatic.   Nose: Nose normal.   Eyes: Right eye exhibits no discharge. Left eye exhibits no discharge. Right conjunctiva has no hemorrhage. Left conjunctiva has no hemorrhage. Right pupil is round. Left pupil is round. Pupils are equal.   Neck: Neck supple. No JVD present. Carotid bruit is not present. No thyromegaly present.   Cardiovascular: Normal rate, regular rhythm, S1 normal and S2 normal.  No extrasystoles are present. PMI is not displaced. Exam reveals gallop and S4. Exam reveals no S3.   Murmur heard.   Harsh midsystolic murmur is present with a grade of 2/6 at the upper right sternal border.  Pulses:       Radial pulses are 2+ on the right side, and 2+ on the left side.        Dorsalis pedis pulses are 1+ on the right side, and 1+ on the left side.        Posterior " tibial pulses are 1+ on the right side, and 1+ on the left side.   Pulmonary/Chest: Effort normal and breath sounds normal.   Abdominal: Soft. Normal appearance. There is no hepatosplenomegaly. There is no tenderness.   Musculoskeletal:        Right ankle: She exhibits no swelling, no ecchymosis and no deformity.        Left ankle: She exhibits no swelling, no ecchymosis and no deformity.   Lymphadenopathy:        Head (right side): No submandibular adenopathy present.        Head (left side): No submandibular adenopathy present.     She has no cervical adenopathy.   Neurological: She is alert and oriented to person, place, and time. She is not disoriented. No cranial nerve deficit.   Skin: Skin is warm, dry and intact. No rash noted. She is not diaphoretic.   Psychiatric: She has a normal mood and affect. Her speech is normal and behavior is normal. Judgment and thought content normal. Cognition and memory are normal.       Assessment:      1. Heart failure, diastolic, chronic    2. History of chest pain    3. Paroxysmal atrial fibrillation    4. Chronic anticoagulation    5. Abdominal aortic aneurysm (AAA) without rupture    6. Essential hypertension    7. Hypercholesterolemia    8. Mild obesity        Plan:     1. Heart Failure, Diastolic, Chronic   2/21/2011: Echo: Moderate left ventricular hypertrophy. Moderate diastolic dysfunction. Mild to moderate mitral regurgitation.   On metoprolol 25 mg Q12, lisinopril 10 mg Q24, spironolactone 25 mg Q24 and hctz 12.5 mg Q24.   Well compensated.    2. History of Chest Pain   11/15/2013: ECG: NSR. Left axis deviation.   11/25/2013: Stress Echo: 6:30 min. Negative.   5/19/2015: Seen at [facility] ER for atypical chest pain.   6/2/2015: Stress Echo: 6:00 min. No CP. ECG negative. Echo negative. Mildly sigmoid septum.    3/15/2016: Recurrent atypical chest pain.   Appeared chest pain was non cardiac in origin.   Mostly resolved.   Reassurance.    3. Atrial  Fibrillation   9/9/2019: Diagnosed with paroxysmal atrial fibrillation.   [id number] ([id number]).   9/10/2019: CV.   On apixiban.   On metoprolol 25 mg Q12.   No clinical recurrence.   May want to get Kardia.    4. Chronic Anticoagulation   9/9/2019: Diagnosed with paroxysmal atrial fibrillation.   [id number] ([id number]).   On apixiban.   No aspirin or NSAID.    5. Abdominal Aortic Aneurysm   1/24/2018: CT abdomen: Small AAA 3.1 cm.   1/2/2019: U/S AAA: Small AAA 2.5 cm x 3.0 cm.   1/2020: Do U/S AAA. Then yearly.      6. Hypertension   2000: Diagnosed.   On metoprolol 25 mg Q12, lisinopril 10 mg Q24, spironolactone 25 mg Q24 and hctz 12.5 mg Q24.   Keeping log at home.    Well controlled.    7. Hypercholesterolemia   2005: Began statin.   On atorvastatin 80 mg Q24 and ezetimide 10 mg Q24.   Tells me well controlled.    8. Mild Obesity   4/10/2017: Weight 98 kg. BMI 32.   Encouraged to lose weight.    9. Gastroesophageal Reflux Disease    4/2014: Diagnosed.   On omeprazole 20 mg Q24.   Seldom bothers her.    10. Primary Care   Dr. Doe Bingham.    F/u 2 months.    Mira Mcknight M.D.      11/16/2019 3:34 PM, Addendum:    I have been informed about plans for surgery - mastectomy  - Dr. Kennedy John, III..    She should hold apixiban for 48 hours prior to surgery.    Appears cardiac risk with planned surgery is acceptable.    Mira Mcknight M.D.      Copy:    Dr. Kennedy John, III.

## 2019-12-03 ENCOUNTER — OFFICE VISIT (OUTPATIENT)
Dept: CARDIOLOGY | Facility: CLINIC | Age: 79
End: 2019-12-03
Attending: INTERNAL MEDICINE
Payer: MEDICARE

## 2019-12-03 VITALS
SYSTOLIC BLOOD PRESSURE: 114 MMHG | HEIGHT: 69 IN | WEIGHT: 214 LBS | DIASTOLIC BLOOD PRESSURE: 60 MMHG | HEART RATE: 62 BPM | BODY MASS INDEX: 31.7 KG/M2

## 2019-12-03 DIAGNOSIS — Z79.01 CHRONIC ANTICOAGULATION: ICD-10-CM

## 2019-12-03 DIAGNOSIS — E66.9 MILD OBESITY: ICD-10-CM

## 2019-12-03 DIAGNOSIS — I71.40 ABDOMINAL AORTIC ANEURYSM (AAA) WITHOUT RUPTURE: ICD-10-CM

## 2019-12-03 DIAGNOSIS — C50.111 MALIGNANT NEOPLASM OF CENTRAL PORTION OF RIGHT FEMALE BREAST, UNSPECIFIED ESTROGEN RECEPTOR STATUS: ICD-10-CM

## 2019-12-03 DIAGNOSIS — K21.9 GASTROESOPHAGEAL REFLUX DISEASE WITHOUT ESOPHAGITIS: ICD-10-CM

## 2019-12-03 DIAGNOSIS — I10 ESSENTIAL HYPERTENSION: ICD-10-CM

## 2019-12-03 DIAGNOSIS — Z01.810 PRE-OPERATIVE CARDIOVASCULAR EXAMINATION: ICD-10-CM

## 2019-12-03 DIAGNOSIS — I50.32 HEART FAILURE, DIASTOLIC, CHRONIC: ICD-10-CM

## 2019-12-03 DIAGNOSIS — I48.0 PAROXYSMAL ATRIAL FIBRILLATION: ICD-10-CM

## 2019-12-03 DIAGNOSIS — E78.00 HYPERCHOLESTEROLEMIA: ICD-10-CM

## 2019-12-03 DIAGNOSIS — Z87.898 HISTORY OF CHEST PAIN: ICD-10-CM

## 2019-12-03 PROBLEM — C50.919 BREAST CANCER: Status: ACTIVE | Noted: 2019-12-03

## 2019-12-03 PROCEDURE — 3078F PR MOST RECENT DIASTOLIC BLOOD PRESSURE < 80 MM HG: ICD-10-PCS | Mod: CPTII,S$GLB,, | Performed by: INTERNAL MEDICINE

## 2019-12-03 PROCEDURE — 1159F MED LIST DOCD IN RCRD: CPT | Mod: S$GLB,,, | Performed by: INTERNAL MEDICINE

## 2019-12-03 PROCEDURE — 3074F SYST BP LT 130 MM HG: CPT | Mod: CPTII,S$GLB,, | Performed by: INTERNAL MEDICINE

## 2019-12-03 PROCEDURE — 3074F PR MOST RECENT SYSTOLIC BLOOD PRESSURE < 130 MM HG: ICD-10-PCS | Mod: CPTII,S$GLB,, | Performed by: INTERNAL MEDICINE

## 2019-12-03 PROCEDURE — 3078F DIAST BP <80 MM HG: CPT | Mod: CPTII,S$GLB,, | Performed by: INTERNAL MEDICINE

## 2019-12-03 PROCEDURE — 1159F PR MEDICATION LIST DOCUMENTED IN MEDICAL RECORD: ICD-10-PCS | Mod: S$GLB,,, | Performed by: INTERNAL MEDICINE

## 2019-12-03 PROCEDURE — 99214 PR OFFICE/OUTPT VISIT, EST, LEVL IV, 30-39 MIN: ICD-10-PCS | Mod: S$GLB,,, | Performed by: INTERNAL MEDICINE

## 2019-12-03 PROCEDURE — 99214 OFFICE O/P EST MOD 30 MIN: CPT | Mod: S$GLB,,, | Performed by: INTERNAL MEDICINE

## 2019-12-03 NOTE — PROGRESS NOTES
Subjective:     Shanice Grewal is a 79 y.o. female with hypertension and hypercholesterolemia. In 2019 she was told she may have impaired fasting glucose. She is mildly obese. She has hypertensive heart disease with moderate diastolic dysfunction. She had an episode of chest pain when shopping on 11/14/2013. An ambulance was called for and she was checked at her home. Advised to follow up with her doctor as outpatient. She had a Stress Echo in 11/2013 that was fine. She was diagnosed with esophageal reflux disease in 4/2014. After she began a proton pump inhibitor and sucralfate her epigastric pain resolved. In 5/2015 she was seen at [facility] ER for atypical chest pain. She underwent a Stress Echocardiogram in 6/2015 during which she was able to exercise for 6:00 minutes. There was no chest pain and the ECG was negative. The Echo was negative as well except for a mildly sigmoid septum. The chest pain has since resolved. In 5/2018 she had gynecological surgery. On 9/9/2019 she had onset of palpitations and was diagnosed with atrial fibrillation. On 9/10/2019 she underwent Electrical Cardioversion. She has a [id number] score of 6 (QAS1BVj) and was begun on apixiban. In 11/2019 she was diagnosed wiih breast cancer of the right breast. The plan is right mastectomy scheduled for 12/5/2019. No exertional chest pain or exertional dyspnea. No palpitations or weak spells. Occasional a warm feeling in the right calf that can come on at essentially any time and dose not appear related to activities. No bleeding. Feeling well overall.      Congestive Heart Failure   Presents for follow-up visit. The disease course has been stable. Pertinent negatives include no abdominal pain, chest pain, chest pressure, claudication, edema, fatigue, muscle weakness, near-syncope, nocturia, orthopnea, palpitations, paroxysmal nocturnal dyspnea, shortness of breath or unexpected weight change. The symptoms have been stable.   Chest Pain    This is  a chronic problem. The current episode started more than 1 year ago. The problem has been resolved. Pertinent negatives include no abdominal pain, back pain, claudication, cough, diaphoresis, dizziness, exertional chest pressure, fever, headaches, hemoptysis, irregular heartbeat, leg pain, lower extremity edema, malaise/fatigue, nausea, near-syncope, numbness, orthopnea, palpitations, PND, shortness of breath, sputum production, syncope, vomiting or weakness. The pain is aggravated by nothing.   Her past medical history is significant for CHF, hyperlipidemia and hypertension.   Pertinent negatives for past medical history include no diabetes and no muscle weakness.   Atrial Fibrillation   Presents for follow-up visit. Symptoms include hypertension. Symptoms are negative for bradycardia, chest pain, dizziness, hypotension, palpitations, shortness of breath, syncope, tachycardia and weakness. The symptoms have been stable. Past medical history includes atrial fibrillation, CHF and hyperlipidemia.   Hypertension   This is a chronic problem. The current episode started more than 1 year ago. The problem is unchanged. The problem is controlled (usually 120-130/70-80 mmHg at home). Pertinent negatives include no anxiety, blurred vision, chest pain, headaches, malaise/fatigue, neck pain, orthopnea, palpitations, peripheral edema, PND, shortness of breath or sweats. There is no history of chronic renal disease.   Hyperlipidemia   This is a chronic problem. The current episode started more than 1 year ago. The problem is controlled. Recent lipid tests were reviewed and are normal. Exacerbating diseases include obesity. She has no history of chronic renal disease, diabetes, hypothyroidism, liver disease or nephrotic syndrome. Pertinent negatives include no chest pain, focal sensory loss, focal weakness, leg pain, myalgias or shortness of breath.       Review of Systems   Constitution: Negative for diaphoresis, fatigue, fever,  malaise/fatigue and unexpected weight change.   HENT: Negative for nosebleeds.    Eyes: Negative for blurred vision, pain, vision loss in left eye and vision loss in right eye.   Cardiovascular: Negative for chest pain, claudication, dyspnea on exertion, irregular heartbeat, leg swelling, near-syncope, orthopnea, palpitations, paroxysmal nocturnal dyspnea and syncope.   Respiratory: Negative for cough, hemoptysis, shortness of breath, sleep disturbances due to breathing, sputum production and wheezing.    Endocrine: Negative for cold intolerance and heat intolerance.   Hematologic/Lymphatic: Negative for bleeding problem. Does not bruise/bleed easily.   Skin: Negative for color change and rash.   Musculoskeletal: Positive for arthritis. Negative for back pain, falls, joint pain, muscle weakness, myalgias and neck pain.   Gastrointestinal: Negative for abdominal pain, heartburn, hematemesis, hematochezia, hemorrhoids, jaundice, melena, nausea and vomiting.   Genitourinary: Negative for dysuria, hematuria and nocturia.   Neurological: Negative for dizziness, focal weakness, headaches, light-headedness, loss of balance, numbness, tremors, vertigo and weakness.   Psychiatric/Behavioral: Negative for altered mental status, depression and memory loss. The patient is not nervous/anxious.    Allergic/Immunologic: Negative for hives and persistent infections.       Current Outpatient Medications on File Prior to Visit   Medication Sig Dispense Refill    apixaban (ELIQUIS) 5 mg Tab Take 1 tablet (5 mg total) by mouth 2 (two) times daily. 180 tablet 3    atorvastatin (LIPITOR) 80 MG tablet Take 1 tablet (80 mg total) by mouth once daily. 90 tablet 3    ergocalciferol (VITAMIN D2) 50,000 unit Cap Take 50,000 Units by mouth every 7 days.      ezetimibe (ZETIA) 10 mg tablet Take 1 tablet (10 mg total) by mouth once daily. 90 tablet 3    fish oil-omega-3 fatty acids 300-1,000 mg capsule Take 2 g by mouth once daily.       "fluticasone (FLONASE) 50 mcg/actuation nasal spray       FLUVIRIN [id number] 45 mcg (15 mcg x 3)/0.5 mL Susp       gabapentin (NEURONTIN) 100 MG capsule Take 100 mg by mouth 3 (three) times daily.      hydroCHLOROthiazide (MICROZIDE) 12.5 mg capsule Take 1 capsule (12.5 mg total) by mouth once daily. 90 capsule 3    lisinopril 10 MG tablet Take 1 tablet (10 mg total) by mouth once daily. 90 tablet 3    metoclopramide HCl (REGLAN) 10 MG tablet       metoprolol tartrate (LOPRESSOR) 25 MG Tab Take 1 tablet (25 mg total) by mouth 2 (two) times daily. 180 tablet 3    omeprazole (PRILOSEC) 20 MG capsule Take 20 mg by mouth once daily.      PATANOL 0.1 % ophthalmic solution       spironolactone (ALDACTONE) 25 MG tablet Take 1 tablet (25 mg total) by mouth once daily. 90 tablet 3    sucralfate (CARAFATE) 1 gram tablet Take 1 g by mouth 4 (four) times daily before meals and nightly.        No current facility-administered medications on file prior to visit.        /60   Pulse 62   Ht 5' 9" (1.753 m)   Wt 97.1 kg (214 lb)   BMI 31.60 kg/m²       Objective:     Physical Exam   Constitutional: She is oriented to person, place, and time. She appears well-developed and well-nourished. She does not appear ill. No distress.   HENT:   Head: Normocephalic and atraumatic.   Nose: Nose normal.   Eyes: Right eye exhibits no discharge. Left eye exhibits no discharge. Right conjunctiva has no hemorrhage. Left conjunctiva has no hemorrhage. Right pupil is round. Left pupil is round. Pupils are equal.   Neck: Neck supple. No JVD present. Carotid bruit is not present. No thyromegaly present.   Cardiovascular: Normal rate, regular rhythm, S1 normal and S2 normal.  No extrasystoles are present. PMI is not displaced. Exam reveals gallop and S4. Exam reveals no S3.   Murmur heard.   Harsh midsystolic murmur is present with a grade of 2/6 at the upper right sternal border.  Pulses:       Radial pulses are 2+ on the right side, " and 2+ on the left side.        Dorsalis pedis pulses are 1+ on the right side, and 1+ on the left side.        Posterior tibial pulses are 1+ on the right side, and 1+ on the left side.   Pulmonary/Chest: Effort normal and breath sounds normal.   Abdominal: Soft. Normal appearance. There is no hepatosplenomegaly. There is no tenderness.   Musculoskeletal:        Right ankle: She exhibits no swelling, no ecchymosis and no deformity.        Left ankle: She exhibits no swelling, no ecchymosis and no deformity.   Lymphadenopathy:        Head (right side): No submandibular adenopathy present.        Head (left side): No submandibular adenopathy present.     She has no cervical adenopathy.   Neurological: She is alert and oriented to person, place, and time. She is not disoriented. No cranial nerve deficit.   Skin: Skin is warm, dry and intact. No rash noted. She is not diaphoretic.   Psychiatric: She has a normal mood and affect. Her speech is normal and behavior is normal. Judgment and thought content normal. Cognition and memory are normal.       Assessment:      1. Heart failure, diastolic, chronic    2. History of chest pain    3. Paroxysmal atrial fibrillation    4. Chronic anticoagulation    5. Abdominal aortic aneurysm (AAA) without rupture    6. Essential hypertension    7. Hypercholesterolemia    8. Mild obesity    9. Gastroesophageal reflux disease without esophagitis    10. Malignant neoplasm of central portion of right female breast, unspecified estrogen receptor status    11. Pre-operative cardiovascular examination        Plan:     1. Heart Failure, Diastolic, Chronic   2/21/2011: Echo: Moderate left ventricular hypertrophy. Moderate diastolic dysfunction. Mild to moderate mitral regurgitation.   On metoprolol 25 mg Q12, lisinopril 10 mg Q24, spironolactone 25 mg Q24 and hctz 12.5 mg Q24.   Well compensated.    2. History of Chest Pain   11/15/2013: ECG: NSR. Left axis deviation.   11/25/2013: Stress Echo:  6:30 min. Negative.   5/19/2015: Seen at [facility] ER for atypical chest pain.   6/2/2015: Stress Echo: 6:00 min. No CP. ECG negative. Echo negative. Mildly sigmoid septum.    3/15/2016: Recurrent atypical chest pain.   Appeared chest pain was non cardiac in origin.   Mostly resolved.   Reassurance.    3. Atrial Fibrillation   9/9/2019: Diagnosed with paroxysmal atrial fibrillation.   [id number] ([id number]).   9/10/2019: CV.   On apixiban.   On metoprolol 25 mg Q12.   No clinical recurrence.   May want to get Kardia.    4. Chronic Anticoagulation   9/9/2019: Diagnosed with paroxysmal atrial fibrillation.   [id number]=0 ([id number]).   On apixiban.   No aspirin or NSAID.    5. Abdominal Aortic Aneurysm   1/24/2018: CT abdomen: Small AAA 3.1 cm.   1/2/2019: U/S AAA: Small AAA 2.5 cm x 3.0 cm.   1/2020: Do U/S AAA. Then yearly.      6. Hypertension   2000: Diagnosed.   On metoprolol 25 mg Q12, lisinopril 10 mg Q24, spironolactone 25 mg Q24 and hctz 12.5 mg Q24.   Keeping log at home.    Well controlled.    7. Hypercholesterolemia   2005: Began statin.   On atorvastatin 80 mg Q24 and ezetimide 10 mg Q24.   Tells me well controlled.    8. Mild Obesity   4/10/2017: Weight 98 kg. BMI 32.   Encouraged to lose weight.    9. Gastroesophageal Reflux Disease    4/2014: Diagnosed.   On omeprazole 20 mg Q24.   Seldom bothers her.    10. Breast Cancer   11/2019: Diagnosed. Right breast.   12/5/2019: Plan is right mastectomy.   Dr. Kennedy John III..    11. Primary Care   Dr. Doe Bingham.    12. Pre Operative Cardiovascular Evaluation   I have been informed about plans for surgery - mastectomy  - Dr. Kennedy John, III..   She should hold apixiban for 48 hours prior to surgery.   Appears cardiac risk with planned surgery is acceptable.     F/u 4 months.    Mira Mcknight M.D.

## 2019-12-22 DIAGNOSIS — E78.00 HYPERCHOLESTEROLEMIA: ICD-10-CM

## 2019-12-22 RX ORDER — ATORVASTATIN CALCIUM 80 MG/1
TABLET, FILM COATED ORAL
Qty: 90 TABLET | Refills: 3 | Status: SHIPPED | OUTPATIENT
Start: 2019-12-22 | End: 2020-06-22 | Stop reason: SDUPTHER

## 2020-02-07 DIAGNOSIS — I10 ESSENTIAL HYPERTENSION: ICD-10-CM

## 2020-02-07 RX ORDER — LISINOPRIL 10 MG/1
TABLET ORAL
Qty: 90 TABLET | Refills: 3 | Status: SHIPPED | OUTPATIENT
Start: 2020-02-07 | End: 2020-06-22 | Stop reason: SDUPTHER

## 2020-06-22 ENCOUNTER — OFFICE VISIT (OUTPATIENT)
Dept: CARDIOLOGY | Facility: CLINIC | Age: 80
End: 2020-06-22
Attending: INTERNAL MEDICINE
Payer: MEDICARE

## 2020-06-22 ENCOUNTER — HOSPITAL ENCOUNTER (OUTPATIENT)
Dept: CARDIOLOGY | Facility: OTHER | Age: 80
Discharge: HOME | End: 2020-06-22
Attending: INTERNAL MEDICINE
Payer: MEDICARE

## 2020-06-22 VITALS
WEIGHT: 231.5 LBS | DIASTOLIC BLOOD PRESSURE: 70 MMHG | BODY MASS INDEX: 35.09 KG/M2 | HEART RATE: 80 BPM | HEIGHT: 68 IN | SYSTOLIC BLOOD PRESSURE: 111 MMHG

## 2020-06-22 DIAGNOSIS — I71.40 ABDOMINAL AORTIC ANEURYSM (AAA) WITHOUT RUPTURE: ICD-10-CM

## 2020-06-22 DIAGNOSIS — K21.9 GASTROESOPHAGEAL REFLUX DISEASE WITHOUT ESOPHAGITIS: ICD-10-CM

## 2020-06-22 DIAGNOSIS — Z87.898 HISTORY OF CHEST PAIN: ICD-10-CM

## 2020-06-22 DIAGNOSIS — I50.32 HEART FAILURE, DIASTOLIC, CHRONIC: ICD-10-CM

## 2020-06-22 DIAGNOSIS — Z79.01 CHRONIC ANTICOAGULATION: ICD-10-CM

## 2020-06-22 DIAGNOSIS — E66.9 MILD OBESITY: ICD-10-CM

## 2020-06-22 DIAGNOSIS — Z85.3 HISTORY OF BREAST CANCER: ICD-10-CM

## 2020-06-22 DIAGNOSIS — I48.0 PAROXYSMAL ATRIAL FIBRILLATION: ICD-10-CM

## 2020-06-22 DIAGNOSIS — I10 ESSENTIAL HYPERTENSION: ICD-10-CM

## 2020-06-22 DIAGNOSIS — E78.00 HYPERCHOLESTEROLEMIA: ICD-10-CM

## 2020-06-22 PROBLEM — Z01.810 PRE-OPERATIVE CARDIOVASCULAR EXAMINATION: Status: RESOLVED | Noted: 2019-12-03 | Resolved: 2020-06-22

## 2020-06-22 LAB
ABDOMINAL INFRARENAL AORTA AP: 3.1 CM
ABDOMINAL INFRARENAL AORTA ED VEL: 12 CM/S
ABDOMINAL INFRARENAL AORTA PS VEL: 57 CM/S
ABDOMINAL INFRARENAL AORTA TRANS: 3 CM
ABDOMINAL JUXTARENAL AORTA AP: 2 CM
ABDOMINAL JUXTARENAL AORTA ED VEL: 10 CM/S
ABDOMINAL JUXTARENAL AORTA PS VEL: 35 CM/S
ABDOMINAL JUXTARENAL AORTA TRANS: 3 CM
ABDOMINAL SUPRARENAL AORTA AP: 2.1 CM
ABDOMINAL SUPRARENAL AORTA ED VEL: 16 CM/S
ABDOMINAL SUPRARENAL AORTA PS VEL: 50 CM/S
ABDOMINAL SUPRARENAL AORTA TRANS: 2.3 CM
OHS CV AAA LARGEST SIZE: 3.1 CM

## 2020-06-22 PROCEDURE — 76706 CV US AAA SCREENING (CUPID ONLY): ICD-10-PCS | Mod: 26,,, | Performed by: INTERNAL MEDICINE

## 2020-06-22 PROCEDURE — 99214 PR OFFICE/OUTPT VISIT, EST, LEVL IV, 30-39 MIN: ICD-10-PCS | Mod: 25,S$GLB,, | Performed by: INTERNAL MEDICINE

## 2020-06-22 PROCEDURE — 3074F PR MOST RECENT SYSTOLIC BLOOD PRESSURE < 130 MM HG: ICD-10-PCS | Mod: CPTII,S$GLB,, | Performed by: INTERNAL MEDICINE

## 2020-06-22 PROCEDURE — 99999 PR PBB SHADOW E&M-EST. PATIENT-LVL III: CPT | Mod: PBBFAC,,, | Performed by: INTERNAL MEDICINE

## 2020-06-22 PROCEDURE — 3078F DIAST BP <80 MM HG: CPT | Mod: CPTII,S$GLB,, | Performed by: INTERNAL MEDICINE

## 2020-06-22 PROCEDURE — 3074F SYST BP LT 130 MM HG: CPT | Mod: CPTII,S$GLB,, | Performed by: INTERNAL MEDICINE

## 2020-06-22 PROCEDURE — 76706 US ABDL AORTA SCREEN AAA: CPT

## 2020-06-22 PROCEDURE — 1159F MED LIST DOCD IN RCRD: CPT | Mod: S$GLB,,, | Performed by: INTERNAL MEDICINE

## 2020-06-22 PROCEDURE — 99999 PR PBB SHADOW E&M-EST. PATIENT-LVL III: ICD-10-PCS | Mod: PBBFAC,,, | Performed by: INTERNAL MEDICINE

## 2020-06-22 PROCEDURE — 3078F PR MOST RECENT DIASTOLIC BLOOD PRESSURE < 80 MM HG: ICD-10-PCS | Mod: CPTII,S$GLB,, | Performed by: INTERNAL MEDICINE

## 2020-06-22 PROCEDURE — 1159F PR MEDICATION LIST DOCUMENTED IN MEDICAL RECORD: ICD-10-PCS | Mod: S$GLB,,, | Performed by: INTERNAL MEDICINE

## 2020-06-22 PROCEDURE — 76706 US ABDL AORTA SCREEN AAA: CPT | Mod: 26,,, | Performed by: INTERNAL MEDICINE

## 2020-06-22 PROCEDURE — 99214 OFFICE O/P EST MOD 30 MIN: CPT | Mod: 25,S$GLB,, | Performed by: INTERNAL MEDICINE

## 2020-06-22 RX ORDER — AMOXICILLIN 500 MG
1 CAPSULE ORAL DAILY
Qty: 90 CAPSULE | Refills: 3 | Status: SHIPPED | OUTPATIENT
Start: 2020-06-22 | End: 2024-03-20

## 2020-06-22 RX ORDER — SPIRONOLACTONE 25 MG/1
25 TABLET ORAL DAILY
Qty: 90 TABLET | Refills: 3 | Status: SHIPPED | OUTPATIENT
Start: 2020-06-22 | End: 2021-02-26 | Stop reason: SDUPTHER

## 2020-06-22 RX ORDER — METOPROLOL TARTRATE 25 MG/1
25 TABLET, FILM COATED ORAL 2 TIMES DAILY
Qty: 180 TABLET | Refills: 3 | Status: SHIPPED | OUTPATIENT
Start: 2020-06-22 | End: 2021-02-26 | Stop reason: SDUPTHER

## 2020-06-22 RX ORDER — ATORVASTATIN CALCIUM 80 MG/1
80 TABLET, FILM COATED ORAL DAILY
Qty: 90 TABLET | Refills: 3 | Status: SHIPPED | OUTPATIENT
Start: 2020-06-22 | End: 2021-02-26 | Stop reason: SDUPTHER

## 2020-06-22 RX ORDER — EZETIMIBE 10 MG/1
10 TABLET ORAL DAILY
Qty: 90 TABLET | Refills: 3 | Status: SHIPPED | OUTPATIENT
Start: 2020-06-22 | End: 2021-02-26 | Stop reason: SDUPTHER

## 2020-06-22 RX ORDER — HYDROCHLOROTHIAZIDE 12.5 MG/1
12.5 CAPSULE ORAL DAILY
Qty: 90 CAPSULE | Refills: 3 | Status: SHIPPED | OUTPATIENT
Start: 2020-06-22 | End: 2021-02-26 | Stop reason: SDUPTHER

## 2020-06-22 RX ORDER — LISINOPRIL 10 MG/1
10 TABLET ORAL DAILY
Qty: 90 TABLET | Refills: 3 | Status: SHIPPED | OUTPATIENT
Start: 2020-06-22 | End: 2021-02-26 | Stop reason: SDUPTHER

## 2020-06-22 NOTE — PROGRESS NOTES
Subjective:     Shanice Grewal is a 80 y.o. female with hypertension and hypercholesterolemia. In 2019 she was told she may have impaired fasting glucose. She is mildly obese. She has hypertensive heart disease with moderate diastolic dysfunction. She had an episode of chest pain when shopping on 11/14/2013. An ambulance was called for and she was checked at her home. Advised to follow up with her doctor as outpatient. She had a Stress Echo in 11/2013 that was fine. She was diagnosed with esophageal reflux disease in 4/2014. After she began a proton pump inhibitor and sucralfate her epigastric pain resolved. In 5/2015 she was seen at [facility] ER for atypical chest pain. She underwent a Stress Echocardiogram in 6/2015 during which she was able to exercise for 6:00 minutes. There was no chest pain and the ECG was negative. The Echo was negative as well except for a mildly sigmoid septum. The chest pain has since resolved. In 5/2018 she had gynecological surgery. On 9/9/2019 she had onset of palpitations and was diagnosed with atrial fibrillation. On 9/10/2019 she underwent Electrical Cardioversion. She has a [id number] score of 6 (AIA7XWe) and was begun on apixiban. In 11/2019 she was diagnosed wiih breast cancer of the right breast. She underwent right mastectomy on 12/5/2019. No exertional chest pain or exertional dyspnea. No palpitations or weak spells. Occasional a warm feeling in the right calf that can come on at essentially any time and does not appear related to activities. No bleeding. Feeling well overall.      Congestive Heart Failure  Presents for follow-up visit. The disease course has been stable. Pertinent negatives include no abdominal pain, chest pain, chest pressure, claudication, edema, fatigue, muscle weakness, near-syncope, nocturia, orthopnea, palpitations, paroxysmal nocturnal dyspnea, shortness of breath or unexpected weight change. The symptoms have been stable.   Chest Pain   This is a chronic  problem. The current episode started more than 1 year ago. The problem has been resolved. Pertinent negatives include no abdominal pain, back pain, claudication, cough, diaphoresis, dizziness, exertional chest pressure, fever, headaches, hemoptysis, irregular heartbeat, leg pain, lower extremity edema, malaise/fatigue, nausea, near-syncope, numbness, orthopnea, palpitations, PND, shortness of breath, sputum production, syncope, vomiting or weakness. The pain is aggravated by nothing.   Her past medical history is significant for CHF, hyperlipidemia and hypertension.   Pertinent negatives for past medical history include no diabetes and no muscle weakness.   Atrial Fibrillation  Presents for follow-up visit. Symptoms include hypertension. Symptoms are negative for bradycardia, chest pain, dizziness, hypotension, palpitations, shortness of breath, syncope, tachycardia and weakness. The symptoms have been stable. Past medical history includes atrial fibrillation, CHF and hyperlipidemia.   Hypertension  This is a chronic problem. The current episode started more than 1 year ago. The problem is unchanged. The problem is controlled (usually 120-130/70-80 mmHg at home). Pertinent negatives include no anxiety, blurred vision, chest pain, headaches, malaise/fatigue, neck pain, orthopnea, palpitations, peripheral edema, PND, shortness of breath or sweats. There is no history of chronic renal disease.   Hyperlipidemia  This is a chronic problem. The current episode started more than 1 year ago. The problem is controlled. Recent lipid tests were reviewed and are normal. Exacerbating diseases include obesity. She has no history of chronic renal disease, diabetes, hypothyroidism, liver disease or nephrotic syndrome. Pertinent negatives include no chest pain, focal sensory loss, focal weakness, leg pain, myalgias or shortness of breath.       Review of Systems   Constitution: Negative for diaphoresis, fatigue, fever,  malaise/fatigue and unexpected weight change.   HENT: Negative for nosebleeds.    Eyes: Negative for blurred vision, pain, vision loss in left eye and vision loss in right eye.   Cardiovascular: Negative for chest pain, claudication, dyspnea on exertion, irregular heartbeat, leg swelling, near-syncope, orthopnea, palpitations, paroxysmal nocturnal dyspnea and syncope.   Respiratory: Negative for cough, hemoptysis, shortness of breath, sputum production and wheezing.    Endocrine: Negative for cold intolerance and heat intolerance.   Hematologic/Lymphatic: Negative for bleeding problem. Does not bruise/bleed easily.   Skin: Negative for color change and rash.   Musculoskeletal: Positive for arthritis. Negative for back pain, falls, joint pain, muscle weakness, myalgias and neck pain.   Gastrointestinal: Negative for abdominal pain, heartburn, hematemesis, hematochezia, hemorrhoids, jaundice, melena, nausea and vomiting.   Genitourinary: Negative for dysuria, hematuria and nocturia.   Neurological: Negative for dizziness, focal weakness, headaches, light-headedness, loss of balance, numbness, tremors, vertigo and weakness.   Psychiatric/Behavioral: Negative for altered mental status, depression and memory loss. The patient is not nervous/anxious.    Allergic/Immunologic: Negative for hives and persistent infections.       Current Outpatient Medications on File Prior to Visit   Medication Sig Dispense Refill    apixaban (ELIQUIS) 5 mg Tab Take 1 tablet (5 mg total) by mouth 2 (two) times daily. 180 tablet 3    atorvastatin (LIPITOR) 80 MG tablet TAKE 1 TABLET(80 MG) BY MOUTH EVERY DAY 90 tablet 3    ergocalciferol (VITAMIN D2) 50,000 unit Cap Take 50,000 Units by mouth every 7 days.      ezetimibe (ZETIA) 10 mg tablet Take 1 tablet (10 mg total) by mouth once daily. 90 tablet 3    fish oil-omega-3 fatty acids 300-1,000 mg capsule Take 2 g by mouth once daily.      fluticasone (FLONASE) 50 mcg/actuation nasal  "spray       FLUVIRIN [id number] 45 mcg (15 mcg x 3)/0.5 mL Susp       gabapentin (NEURONTIN) 100 MG capsule Take 100 mg by mouth 3 (three) times daily.      hydroCHLOROthiazide (MICROZIDE) 12.5 mg capsule TAKE 1 CAPSULE(12.5 MG) BY MOUTH EVERY DAY 90 capsule 3    lisinopril 10 MG tablet TAKE 1 TABLET(10 MG) BY MOUTH EVERY DAY 90 tablet 3    metoclopramide HCl (REGLAN) 10 MG tablet       metoprolol tartrate (LOPRESSOR) 25 MG Tab Take 1 tablet (25 mg total) by mouth 2 (two) times daily. 180 tablet 3    omeprazole (PRILOSEC) 20 MG capsule Take 20 mg by mouth once daily.      spironolactone (ALDACTONE) 25 MG tablet Take 1 tablet (25 mg total) by mouth once daily. 90 tablet 3    PATANOL 0.1 % ophthalmic solution       sucralfate (CARAFATE) 1 gram tablet Take 1 g by mouth 4 (four) times daily before meals and nightly.        No current facility-administered medications on file prior to visit.        /70 (BP Location: Right arm, Patient Position: Sitting)   Pulse 80   Ht 5' 8" (1.727 m)   Wt 105 kg (231 lb 7.7 oz)   BMI 35.20 kg/m²       Objective:     Physical Exam   Constitutional: She is oriented to person, place, and time. She appears well-developed and well-nourished. She does not appear ill. No distress.   HENT:   Head: Normocephalic and atraumatic.   Nose: Nose normal.   Eyes: Right eye exhibits no discharge. Left eye exhibits no discharge. Right conjunctiva has no hemorrhage. Left conjunctiva has no hemorrhage. Right pupil is round. Left pupil is round. Pupils are equal.   Neck: Neck supple. No JVD present. Carotid bruit is not present. No thyromegaly present.   Cardiovascular: Normal rate, regular rhythm, S1 normal and S2 normal.  No extrasystoles are present. PMI is not displaced. Exam reveals gallop and S4. Exam reveals no S3.   Murmur heard.   Harsh midsystolic murmur is present with a grade of 2/6 at the upper right sternal border.  Pulses:       Radial pulses are 2+ on the right side and 2+ " on the left side.        Dorsalis pedis pulses are 1+ on the right side and 1+ on the left side.        Posterior tibial pulses are 1+ on the right side and 1+ on the left side.   Pulmonary/Chest: Effort normal and breath sounds normal.   Abdominal: Soft. Normal appearance. There is no hepatosplenomegaly. There is no abdominal tenderness.   Musculoskeletal:      Right ankle: She exhibits no swelling, no ecchymosis and no deformity.      Left ankle: She exhibits no swelling, no ecchymosis and no deformity.   Lymphadenopathy:        Head (right side): No submandibular adenopathy present.        Head (left side): No submandibular adenopathy present.     She has no cervical adenopathy.   Neurological: She is alert and oriented to person, place, and time. She is not disoriented. No cranial nerve deficit.   Skin: Skin is warm, dry and intact. She is not diaphoretic.   Psychiatric: She has a normal mood and affect. Her speech is normal and behavior is normal. Judgment and thought content normal. Cognition and memory are normal.       Assessment:      1. Heart failure, diastolic, chronic    2. History of chest pain    3. Paroxysmal atrial fibrillation    4. Chronic anticoagulation    5. Abdominal aortic aneurysm (AAA) without rupture    6. Essential hypertension    7. Hypercholesterolemia    8. Mild obesity    9. Gastroesophageal reflux disease without esophagitis    10. History of breast cancer        Plan:     1. Heart Failure, Diastolic, Chronic   2/21/2011: Echo: Moderate left ventricular hypertrophy. Moderate diastolic dysfunction. Mild to moderate mitral regurgitation.   On metoprolol 25 mg Q12, lisinopril 10 mg Q24, spironolactone 25 mg Q24 and hctz 12.5 mg Q24.   Well compensated.    2. History of Chest Pain   11/15/2013: ECG: NSR. Left axis deviation.   11/25/2013: Stress Echo: 6:30 min. Negative.   5/19/2015: Seen at [facility] ER for atypical chest pain.   6/2/2015: Stress Echo: 6:00 min. No CP. ECG negative. Echo  negative. Mildly sigmoid septum.    3/15/2016: Recurrent atypical chest pain.   Appeared chest pain was non cardiac in origin.   Resolved.   Reassurance.    3. Atrial Fibrillation   9/9/2019: Diagnosed with paroxysmal atrial fibrillation.   [id number]=3 ([id number]).   9/10/2019: CV.   On apixiban.   On metoprolol 25 mg Q12.   No clinical recurrence.     4. Chronic Anticoagulation   9/9/2019: Diagnosed with paroxysmal atrial fibrillation.   [id number]=1 ([id number]).   On apixiban 5 mg Q12.   No aspirin or NSAID.   No bleeding.    5. Abdominal Aortic Aneurysm   1/24/2018: CT abdomen: Small AAA 3.1 cm.   1/2/2019: U/S AAA: Small AAA 2.5 cm x 3.0 cm.   1/2020: Plan was U/S AAA. Then yearly. Do soon.     6. Hypertension   2000: Diagnosed.   On metoprolol 25 mg Q12, lisinopril 10 mg Q24, spironolactone 25 mg Q24 and hctz 12.5 mg Q24.   Keeping log at home.    Well controlled.    7. Hypercholesterolemia   2005: Began statin.   On atorvastatin 80 mg Q24 and ezetimide 10 mg Q24.   Tells me well controlled.     8. Mild Obesity   4/10/2017: Weight 98 kg. BMI 32.   Encouraged to lose weight.    9. Gastroesophageal Reflux Disease    4/2014: Diagnosed.   On omeprazole 20 mg Q24.   Seldom bothers her.    10. History of Breast Cancer   11/2019: Diagnosed. Right breast.   12/5/2019: Right mastectomy.   Dr. Kennedy John, III..    11. Primary Care   Dr. Magdiel Deutsch.    F/u 4 months.    Mira Mcknight M.D.        6/23/2020 8:09 PM, Addendum:    6/22/2020: U/S AAA: Small AAA: 3.0 cm x 3.1 cm.    I discussed the above test result and the implications of the findings over the phone.    Mira Mcknight M.D.

## 2020-10-23 ENCOUNTER — OFFICE VISIT (OUTPATIENT)
Dept: CARDIOLOGY | Facility: CLINIC | Age: 80
End: 2020-10-23
Attending: INTERNAL MEDICINE
Payer: MEDICARE

## 2020-10-23 VITALS
BODY MASS INDEX: 35.38 KG/M2 | HEART RATE: 76 BPM | WEIGHT: 233.44 LBS | SYSTOLIC BLOOD PRESSURE: 126 MMHG | HEIGHT: 68 IN | DIASTOLIC BLOOD PRESSURE: 60 MMHG

## 2020-10-23 DIAGNOSIS — E66.9 MILD OBESITY: ICD-10-CM

## 2020-10-23 DIAGNOSIS — I10 ESSENTIAL HYPERTENSION: ICD-10-CM

## 2020-10-23 DIAGNOSIS — Z79.01 CHRONIC ANTICOAGULATION: ICD-10-CM

## 2020-10-23 DIAGNOSIS — E78.00 HYPERCHOLESTEROLEMIA: ICD-10-CM

## 2020-10-23 DIAGNOSIS — Z85.3 HISTORY OF BREAST CANCER: ICD-10-CM

## 2020-10-23 DIAGNOSIS — I48.0 PAROXYSMAL ATRIAL FIBRILLATION: ICD-10-CM

## 2020-10-23 DIAGNOSIS — K21.9 GASTROESOPHAGEAL REFLUX DISEASE WITHOUT ESOPHAGITIS: ICD-10-CM

## 2020-10-23 DIAGNOSIS — Z87.898 HISTORY OF CHEST PAIN: ICD-10-CM

## 2020-10-23 DIAGNOSIS — I50.32 HEART FAILURE, DIASTOLIC, CHRONIC: ICD-10-CM

## 2020-10-23 DIAGNOSIS — I71.40 ABDOMINAL AORTIC ANEURYSM (AAA) WITHOUT RUPTURE: ICD-10-CM

## 2020-10-23 PROCEDURE — 99214 OFFICE O/P EST MOD 30 MIN: CPT | Mod: S$GLB,,, | Performed by: INTERNAL MEDICINE

## 2020-10-23 PROCEDURE — 99214 PR OFFICE/OUTPT VISIT, EST, LEVL IV, 30-39 MIN: ICD-10-PCS | Mod: S$GLB,,, | Performed by: INTERNAL MEDICINE

## 2020-10-23 PROCEDURE — 1101F PR PT FALLS ASSESS DOC 0-1 FALLS W/OUT INJ PAST YR: ICD-10-PCS | Mod: CPTII,S$GLB,, | Performed by: INTERNAL MEDICINE

## 2020-10-23 PROCEDURE — 1101F PT FALLS ASSESS-DOCD LE1/YR: CPT | Mod: CPTII,S$GLB,, | Performed by: INTERNAL MEDICINE

## 2020-10-23 PROCEDURE — 1126F PR PAIN SEVERITY QUANTIFIED, NO PAIN PRESENT: ICD-10-PCS | Mod: S$GLB,,, | Performed by: INTERNAL MEDICINE

## 2020-10-23 PROCEDURE — 3078F PR MOST RECENT DIASTOLIC BLOOD PRESSURE < 80 MM HG: ICD-10-PCS | Mod: CPTII,S$GLB,, | Performed by: INTERNAL MEDICINE

## 2020-10-23 PROCEDURE — 3074F PR MOST RECENT SYSTOLIC BLOOD PRESSURE < 130 MM HG: ICD-10-PCS | Mod: CPTII,S$GLB,, | Performed by: INTERNAL MEDICINE

## 2020-10-23 PROCEDURE — 3074F SYST BP LT 130 MM HG: CPT | Mod: CPTII,S$GLB,, | Performed by: INTERNAL MEDICINE

## 2020-10-23 PROCEDURE — 1126F AMNT PAIN NOTED NONE PRSNT: CPT | Mod: S$GLB,,, | Performed by: INTERNAL MEDICINE

## 2020-10-23 PROCEDURE — 99999 PR PBB SHADOW E&M-EST. PATIENT-LVL III: CPT | Mod: PBBFAC,,, | Performed by: INTERNAL MEDICINE

## 2020-10-23 PROCEDURE — 1159F MED LIST DOCD IN RCRD: CPT | Mod: S$GLB,,, | Performed by: INTERNAL MEDICINE

## 2020-10-23 PROCEDURE — 99999 PR PBB SHADOW E&M-EST. PATIENT-LVL III: ICD-10-PCS | Mod: PBBFAC,,, | Performed by: INTERNAL MEDICINE

## 2020-10-23 PROCEDURE — 3078F DIAST BP <80 MM HG: CPT | Mod: CPTII,S$GLB,, | Performed by: INTERNAL MEDICINE

## 2020-10-23 PROCEDURE — 1159F PR MEDICATION LIST DOCUMENTED IN MEDICAL RECORD: ICD-10-PCS | Mod: S$GLB,,, | Performed by: INTERNAL MEDICINE

## 2020-10-23 RX ORDER — LORAZEPAM 1 MG/1
1 TABLET ORAL EVERY 12 HOURS PRN
COMMUNITY

## 2020-10-23 RX ORDER — AZELASTINE 1 MG/ML
1 SPRAY, METERED NASAL 2 TIMES DAILY
COMMUNITY

## 2020-10-23 NOTE — PROGRESS NOTES
Subjective:     Shanice Grewal is a 80 y.o. female with hypertension and hypercholesterolemia. In 2019 she was told she may have impaired fasting glucose. She is mildly obese. She has hypertensive heart disease with moderate diastolic dysfunction. She had an episode of chest pain when shopping on 11/14/2013. An ambulance was called for and she was checked at her home. Advised to follow up with her doctor as outpatient. She had a Stress Echo in 11/2013 that was fine. She was diagnosed with esophageal reflux disease in 4/2014. After she began a proton pump inhibitor and sucralfate her epigastric pain resolved. In 5/2015 she was seen at [facility] ER for atypical chest pain. She underwent a Stress Echocardiogram in 6/2015 during which she was able to exercise for 6:00 minutes. There was no chest pain and the ECG was negative. The Echo was negative as well except for a mildly sigmoid septum. The chest pain has since resolved. In 5/2018 she had gynecological surgery. On 9/9/2019 she had onset of palpitations and was diagnosed with atrial fibrillation. On 9/10/2019 she underwent Electrical Cardioversion. She has a [id number] score of 6 ([id number]) and was begun on apixiban. In 11/2019 she was diagnosed wiih breast cancer of the right breast. She underwent right mastectomy on 12/5/2019. No exertional chest pain or exertional dyspnea. No palpitations or weak spells. Occasional a warm feeling in the right calf that can come on at essentially any time and does not appear related to activities. No bleeding. Feeling well overall.      Congestive Heart Failure  Presents for follow-up visit. The disease course has been stable. Pertinent negatives include no abdominal pain, chest pain, chest pressure, claudication, edema, fatigue, muscle weakness, near-syncope, nocturia, orthopnea, palpitations, paroxysmal nocturnal dyspnea, shortness of breath or unexpected weight change. The symptoms have been stable.   Chest Pain   This is a  chronic problem. The current episode started more than 1 year ago. The problem has been resolved. Pertinent negatives include no abdominal pain, back pain, claudication, cough, diaphoresis, dizziness, exertional chest pressure, fever, headaches, hemoptysis, irregular heartbeat, leg pain, lower extremity edema, malaise/fatigue, nausea, near-syncope, numbness, orthopnea, palpitations, PND, shortness of breath, sputum production, syncope, vomiting or weakness. The pain is aggravated by nothing.   Her past medical history is significant for CHF, hyperlipidemia and hypertension.   Pertinent negatives for past medical history include no diabetes and no muscle weakness.   Atrial Fibrillation  Presents for follow-up visit. Symptoms include hypertension. Symptoms are negative for bradycardia, chest pain, dizziness, hypotension, palpitations, shortness of breath, syncope, tachycardia and weakness. The symptoms have been stable. Past medical history includes atrial fibrillation, CHF and hyperlipidemia.   Hypertension  This is a chronic problem. The current episode started more than 1 year ago. The problem is unchanged. The problem is controlled (usually 120-130/70-80 mmHg at home). Pertinent negatives include no anxiety, blurred vision, chest pain, headaches, malaise/fatigue, neck pain, orthopnea, palpitations, peripheral edema, PND, shortness of breath or sweats. There is no history of chronic renal disease.   Hyperlipidemia  This is a chronic problem. The current episode started more than 1 year ago. The problem is controlled. Recent lipid tests were reviewed and are normal. Exacerbating diseases include obesity. She has no history of chronic renal disease, diabetes, hypothyroidism, liver disease or nephrotic syndrome. Pertinent negatives include no chest pain, focal sensory loss, focal weakness, leg pain, myalgias or shortness of breath.       Review of Systems   Constitution: Negative for diaphoresis, fatigue, fever,  malaise/fatigue and unexpected weight change.   HENT: Negative for nosebleeds.    Eyes: Negative for blurred vision, pain, vision loss in left eye and vision loss in right eye.   Cardiovascular: Negative for chest pain, claudication, dyspnea on exertion, irregular heartbeat, leg swelling, near-syncope, orthopnea, palpitations, paroxysmal nocturnal dyspnea and syncope.   Respiratory: Negative for cough, hemoptysis, shortness of breath, sputum production and wheezing.    Endocrine: Negative for cold intolerance and heat intolerance.   Hematologic/Lymphatic: Negative for bleeding problem. Does not bruise/bleed easily.   Skin: Negative for color change and rash.   Musculoskeletal: Positive for arthritis. Negative for back pain, falls, joint pain, muscle weakness, myalgias and neck pain.   Gastrointestinal: Negative for abdominal pain, heartburn, hematemesis, hematochezia, hemorrhoids, jaundice, melena, nausea and vomiting.   Genitourinary: Negative for dysuria, hematuria and nocturia.   Neurological: Negative for dizziness, focal weakness, headaches, light-headedness, loss of balance, numbness, tremors, vertigo and weakness.   Psychiatric/Behavioral: Negative for altered mental status, depression and memory loss. The patient is not nervous/anxious.    Allergic/Immunologic: Negative for hives and persistent infections.       Current Outpatient Medications on File Prior to Visit   Medication Sig Dispense Refill    apixaban (ELIQUIS) 5 mg Tab Take 1 tablet (5 mg total) by mouth 2 (two) times daily. 180 tablet 3    atorvastatin (LIPITOR) 80 MG tablet Take 1 tablet (80 mg total) by mouth once daily. 90 tablet 3    azelastine (ASTELIN) 137 mcg (0.1 %) nasal spray 1 spray by Nasal route 2 (two) times daily.      ergocalciferol (VITAMIN D2) 50,000 unit Cap Take 50,000 Units by mouth every 7 days.      ezetimibe (ZETIA) 10 mg tablet Take 1 tablet (10 mg total) by mouth once daily. 90 tablet 3    fluticasone (FLONASE) 50  "mcg/actuation nasal spray       gabapentin (NEURONTIN) 100 MG capsule Take 100 mg by mouth 3 (three) times daily.      hydroCHLOROthiazide (MICROZIDE) 12.5 mg capsule Take 1 capsule (12.5 mg total) by mouth once daily. 90 capsule 3    lisinopriL 10 MG tablet Take 1 tablet (10 mg total) by mouth once daily. 90 tablet 3    LORazepam (ATIVAN) 1 MG tablet Take 1 mg by mouth every 12 (twelve) hours as needed for Anxiety.      metoprolol tartrate (LOPRESSOR) 25 MG tablet Take 1 tablet (25 mg total) by mouth 2 (two) times daily. 180 tablet 3    omega-3 fatty acids/fish oil (FISH OIL-OMEGA-3 FATTY ACIDS) 300-1,000 mg capsule Take 1 capsule by mouth once daily. 90 capsule 3    omeprazole (PRILOSEC) 20 MG capsule Take 20 mg by mouth once daily.      PATANOL 0.1 % ophthalmic solution       spironolactone (ALDACTONE) 25 MG tablet Take 1 tablet (25 mg total) by mouth once daily. 90 tablet 3    FLUVIRIN [id number] 45 mcg (15 mcg x 3)/0.5 mL Susp       metoclopramide HCl (REGLAN) 10 MG tablet       sucralfate (CARAFATE) 1 gram tablet Take 1 g by mouth 4 (four) times daily before meals and nightly.        No current facility-administered medications on file prior to visit.        /60 (BP Location: Left arm, Patient Position: Sitting, BP Method: Medium (Automatic))   Pulse 76   Ht 5' 8" (1.727 m)   Wt 105.9 kg (233 lb 7.5 oz)   BMI 35.50 kg/m²       Objective:     Physical Exam   Constitutional: She is oriented to person, place, and time. She appears well-developed and well-nourished. She does not appear ill. No distress.   HENT:   Head: Normocephalic and atraumatic.   Nose: Nose normal.   Eyes: Right eye exhibits no discharge. Left eye exhibits no discharge. Right conjunctiva has no hemorrhage. Left conjunctiva has no hemorrhage. Right pupil is round. Left pupil is round. Pupils are equal.   Neck: Neck supple. No JVD present. Carotid bruit is not present. No thyromegaly present.   Cardiovascular: Normal rate, " regular rhythm, S1 normal and S2 normal.  No extrasystoles are present. PMI is not displaced. Exam reveals gallop and S4. Exam reveals no S3.   Murmur heard.   Harsh midsystolic murmur is present with a grade of 2/6 at the upper right sternal border.  Pulses:       Radial pulses are 2+ on the right side and 2+ on the left side.        Dorsalis pedis pulses are 1+ on the right side and 1+ on the left side.        Posterior tibial pulses are 1+ on the right side and 1+ on the left side.   Pulmonary/Chest: Effort normal and breath sounds normal.   Abdominal: Soft. Normal appearance. There is no hepatosplenomegaly. There is no abdominal tenderness.   Musculoskeletal:      Right ankle: She exhibits no swelling, no ecchymosis and no deformity.      Left ankle: She exhibits no swelling, no ecchymosis and no deformity.   Lymphadenopathy:        Head (right side): No submandibular adenopathy present.        Head (left side): No submandibular adenopathy present.     She has no cervical adenopathy.   Neurological: She is alert and oriented to person, place, and time. She is not disoriented. No cranial nerve deficit.   Skin: Skin is warm, dry and intact. No rash noted. She is not diaphoretic.   Psychiatric: She has a normal mood and affect. Her speech is normal and behavior is normal. Judgment and thought content normal. Cognition and memory are normal.       Assessment:      1. Heart failure, diastolic, chronic    2. History of chest pain    3. Paroxysmal atrial fibrillation    4. Chronic anticoagulation    5. Abdominal aortic aneurysm (AAA) without rupture    6. Essential hypertension    7. Hypercholesterolemia    8. Mild obesity    9. Gastroesophageal reflux disease without esophagitis    10. History of breast cancer        Plan:     1. Heart Failure, Diastolic, Chronic   2/21/2011: Echo: Moderate left ventricular hypertrophy. Moderate diastolic dysfunction. Mild to moderate mitral regurgitation.   On metoprolol 25 mg Q12,  lisinopril 10 mg Q24, spironolactone 25 mg Q24 and hctz 12.5 mg Q24.   Well compensated.    2. History of Chest Pain   11/15/2013: ECG: NSR. Left axis deviation.   11/25/2013: Stress Echo: 6:30 min. Negative.   5/19/2015: Seen at [facility] ER for atypical chest pain.   6/2/2015: Stress Echo: 6:00 min. No CP. ECG negative. Echo negative. Mildly sigmoid septum.    3/15/2016: Recurrent atypical chest pain.   Appeared chest pain was non cardiac in origin.   Resolved.   Reassurance.    3. Atrial Fibrillation   9/9/2019: Diagnosed with paroxysmal atrial fibrillation.   [id number] ([id number]).   9/10/2019: CV.   On apixiban.   On metoprolol 25 mg Q12.   No clinical recurrence.     4. Chronic Anticoagulation   9/9/2019: Diagnosed with paroxysmal atrial fibrillation.   [id number] ([id number]).   On apixiban 5 mg Q12.   No aspirin or NSAID.   No bleeding.    5. Abdominal Aortic Aneurysm   1/24/2018: CT abdomen: Small AAA 3.1 cm.   1/2/2019: U/S AAA: Small AAA 2.5 cm x 3.0 cm.   6/22/2020: U/S AAA: Small AAA: 3.0 cm x 3.1 cm.   6/2021: Plan next /S AAA. Then yearly.     6. Hypertension   2000: Diagnosed.   On metoprolol 25 mg Q12, lisinopril 10 mg Q24, spironolactone 25 mg Q24 and hctz 12.5 mg Q24.   Keeping log at home.    Well controlled.    7. Hypercholesterolemia   2005: Began statin.   On atorvastatin 80 mg Q24 and ezetimide 10 mg Q24.   Tells me well controlled.     8. Mild Obesity   4/10/2017: Weight 98 kg. BMI 32.   10/23/2020: Weight 106 kg. BMI 36.   Encouraged to lose weight.    9. Gastroesophageal Reflux Disease    4/2014: Diagnosed.   On omeprazole 20 mg Q24.   Seldom bothers her.    10. History of Breast Cancer   11/2019: Diagnosed. Right breast.   12/5/2019: Right mastectomy.   Dr. Kennedy John, III..    11. Primary Care   Dr. Magdiel Deutsch.    F/u 4 months.    Mira Mcknight M.D.

## 2020-11-17 ENCOUNTER — TELEPHONE (OUTPATIENT)
Dept: CARDIOLOGY | Facility: CLINIC | Age: 80
End: 2020-11-17

## 2020-11-17 NOTE — TELEPHONE ENCOUNTER
Patient calling regarding her blood pressure readings,     118/62  127/74  131/64  135/75   107/61  128/71  113/58

## 2021-02-26 ENCOUNTER — OFFICE VISIT (OUTPATIENT)
Dept: CARDIOLOGY | Facility: CLINIC | Age: 81
End: 2021-02-26
Attending: INTERNAL MEDICINE
Payer: MEDICARE

## 2021-02-26 VITALS
BODY MASS INDEX: 35.68 KG/M2 | DIASTOLIC BLOOD PRESSURE: 71 MMHG | HEART RATE: 71 BPM | HEIGHT: 68 IN | SYSTOLIC BLOOD PRESSURE: 119 MMHG | WEIGHT: 235.44 LBS

## 2021-02-26 DIAGNOSIS — I50.32 HEART FAILURE, DIASTOLIC, CHRONIC: ICD-10-CM

## 2021-02-26 DIAGNOSIS — E78.00 HYPERCHOLESTEROLEMIA: ICD-10-CM

## 2021-02-26 DIAGNOSIS — I10 ESSENTIAL HYPERTENSION: ICD-10-CM

## 2021-02-26 DIAGNOSIS — Z79.01 CHRONIC ANTICOAGULATION: ICD-10-CM

## 2021-02-26 DIAGNOSIS — I71.40 ABDOMINAL AORTIC ANEURYSM (AAA) WITHOUT RUPTURE: ICD-10-CM

## 2021-02-26 DIAGNOSIS — Z87.898 HISTORY OF CHEST PAIN: ICD-10-CM

## 2021-02-26 DIAGNOSIS — I48.0 PAROXYSMAL ATRIAL FIBRILLATION: ICD-10-CM

## 2021-02-26 DIAGNOSIS — K21.9 GASTROESOPHAGEAL REFLUX DISEASE WITHOUT ESOPHAGITIS: ICD-10-CM

## 2021-02-26 DIAGNOSIS — Z85.3 HISTORY OF BREAST CANCER: ICD-10-CM

## 2021-02-26 DIAGNOSIS — E66.01 SEVERE OBESITY: ICD-10-CM

## 2021-02-26 PROCEDURE — 99999 PR PBB SHADOW E&M-EST. PATIENT-LVL III: ICD-10-PCS | Mod: PBBFAC,,, | Performed by: INTERNAL MEDICINE

## 2021-02-26 PROCEDURE — 3078F DIAST BP <80 MM HG: CPT | Mod: CPTII,S$GLB,, | Performed by: INTERNAL MEDICINE

## 2021-02-26 PROCEDURE — 1159F PR MEDICATION LIST DOCUMENTED IN MEDICAL RECORD: ICD-10-PCS | Mod: S$GLB,,, | Performed by: INTERNAL MEDICINE

## 2021-02-26 PROCEDURE — 99214 OFFICE O/P EST MOD 30 MIN: CPT | Mod: S$GLB,,, | Performed by: INTERNAL MEDICINE

## 2021-02-26 PROCEDURE — 3074F SYST BP LT 130 MM HG: CPT | Mod: CPTII,S$GLB,, | Performed by: INTERNAL MEDICINE

## 2021-02-26 PROCEDURE — 99999 PR PBB SHADOW E&M-EST. PATIENT-LVL III: CPT | Mod: PBBFAC,,, | Performed by: INTERNAL MEDICINE

## 2021-02-26 PROCEDURE — 99214 PR OFFICE/OUTPT VISIT, EST, LEVL IV, 30-39 MIN: ICD-10-PCS | Mod: S$GLB,,, | Performed by: INTERNAL MEDICINE

## 2021-02-26 PROCEDURE — 1159F MED LIST DOCD IN RCRD: CPT | Mod: S$GLB,,, | Performed by: INTERNAL MEDICINE

## 2021-02-26 PROCEDURE — 3074F PR MOST RECENT SYSTOLIC BLOOD PRESSURE < 130 MM HG: ICD-10-PCS | Mod: CPTII,S$GLB,, | Performed by: INTERNAL MEDICINE

## 2021-02-26 PROCEDURE — 3078F PR MOST RECENT DIASTOLIC BLOOD PRESSURE < 80 MM HG: ICD-10-PCS | Mod: CPTII,S$GLB,, | Performed by: INTERNAL MEDICINE

## 2021-02-26 RX ORDER — METOPROLOL TARTRATE 25 MG/1
25 TABLET, FILM COATED ORAL 2 TIMES DAILY
Qty: 180 TABLET | Refills: 3 | Status: SHIPPED | OUTPATIENT
Start: 2021-02-26 | End: 2021-06-28 | Stop reason: SDUPTHER

## 2021-02-26 RX ORDER — EZETIMIBE 10 MG/1
10 TABLET ORAL DAILY
Qty: 90 TABLET | Refills: 3 | Status: SHIPPED | OUTPATIENT
Start: 2021-02-26 | End: 2021-04-05 | Stop reason: SDUPTHER

## 2021-02-26 RX ORDER — SPIRONOLACTONE 25 MG/1
25 TABLET ORAL DAILY
Qty: 90 TABLET | Refills: 3 | Status: SHIPPED | OUTPATIENT
Start: 2021-02-26 | End: 2021-06-28 | Stop reason: SDUPTHER

## 2021-02-26 RX ORDER — LISINOPRIL 10 MG/1
10 TABLET ORAL DAILY
Qty: 90 TABLET | Refills: 3 | Status: SHIPPED | OUTPATIENT
Start: 2021-02-26 | End: 2022-03-21 | Stop reason: SDUPTHER

## 2021-02-26 RX ORDER — ATORVASTATIN CALCIUM 80 MG/1
80 TABLET, FILM COATED ORAL DAILY
Qty: 90 TABLET | Refills: 3 | Status: SHIPPED | OUTPATIENT
Start: 2021-02-26 | End: 2021-06-28 | Stop reason: SDUPTHER

## 2021-02-26 RX ORDER — HYDROCHLOROTHIAZIDE 12.5 MG/1
12.5 CAPSULE ORAL DAILY
Qty: 90 CAPSULE | Refills: 3 | Status: SHIPPED | OUTPATIENT
Start: 2021-02-26 | End: 2021-06-28 | Stop reason: SDUPTHER

## 2021-04-05 DIAGNOSIS — E78.00 HYPERCHOLESTEROLEMIA: ICD-10-CM

## 2021-04-05 RX ORDER — EZETIMIBE 10 MG/1
10 TABLET ORAL DAILY
Qty: 90 TABLET | Refills: 3 | Status: SHIPPED | OUTPATIENT
Start: 2021-04-05 | End: 2021-06-22 | Stop reason: SDUPTHER

## 2021-06-22 ENCOUNTER — OFFICE VISIT (OUTPATIENT)
Dept: CARDIOLOGY | Facility: CLINIC | Age: 81
End: 2021-06-22
Attending: INTERNAL MEDICINE
Payer: MEDICARE

## 2021-06-22 VITALS
HEIGHT: 68 IN | DIASTOLIC BLOOD PRESSURE: 66 MMHG | BODY MASS INDEX: 36.19 KG/M2 | HEART RATE: 67 BPM | SYSTOLIC BLOOD PRESSURE: 127 MMHG | WEIGHT: 238.75 LBS

## 2021-06-22 DIAGNOSIS — Z79.01 CHRONIC ANTICOAGULATION: ICD-10-CM

## 2021-06-22 DIAGNOSIS — E78.00 HYPERCHOLESTEROLEMIA: ICD-10-CM

## 2021-06-22 DIAGNOSIS — I71.40 ABDOMINAL AORTIC ANEURYSM (AAA) WITHOUT RUPTURE: ICD-10-CM

## 2021-06-22 DIAGNOSIS — K21.9 GASTROESOPHAGEAL REFLUX DISEASE WITHOUT ESOPHAGITIS: ICD-10-CM

## 2021-06-22 DIAGNOSIS — Z85.3 HISTORY OF BREAST CANCER: ICD-10-CM

## 2021-06-22 DIAGNOSIS — I50.32 HEART FAILURE, DIASTOLIC, CHRONIC: ICD-10-CM

## 2021-06-22 DIAGNOSIS — I48.0 PAROXYSMAL ATRIAL FIBRILLATION: ICD-10-CM

## 2021-06-22 DIAGNOSIS — E66.01 SEVERE OBESITY: ICD-10-CM

## 2021-06-22 DIAGNOSIS — I10 ESSENTIAL HYPERTENSION: ICD-10-CM

## 2021-06-22 DIAGNOSIS — Z87.898 HISTORY OF CHEST PAIN: ICD-10-CM

## 2021-06-22 PROCEDURE — 93000 PR ELECTROCARDIOGRAM, COMPLETE: ICD-10-PCS | Mod: S$GLB,,, | Performed by: INTERNAL MEDICINE

## 2021-06-22 PROCEDURE — 93005 ELECTROCARDIOGRAM TRACING: CPT

## 2021-06-22 PROCEDURE — 93000 ELECTROCARDIOGRAM COMPLETE: CPT | Mod: S$GLB,,, | Performed by: INTERNAL MEDICINE

## 2021-06-22 PROCEDURE — 99999 PR PBB SHADOW E&M-EST. PATIENT-LVL III: ICD-10-PCS | Mod: PBBFAC,,, | Performed by: INTERNAL MEDICINE

## 2021-06-22 PROCEDURE — 99999 PR PBB SHADOW E&M-EST. PATIENT-LVL III: CPT | Mod: PBBFAC,,, | Performed by: INTERNAL MEDICINE

## 2021-06-22 PROCEDURE — 93010 ELECTROCARDIOGRAM REPORT: CPT | Mod: S$GLB,,, | Performed by: INTERNAL MEDICINE

## 2021-06-22 PROCEDURE — 1159F MED LIST DOCD IN RCRD: CPT | Mod: S$GLB,,, | Performed by: INTERNAL MEDICINE

## 2021-06-22 PROCEDURE — 1159F PR MEDICATION LIST DOCUMENTED IN MEDICAL RECORD: ICD-10-PCS | Mod: S$GLB,,, | Performed by: INTERNAL MEDICINE

## 2021-06-22 PROCEDURE — 99214 OFFICE O/P EST MOD 30 MIN: CPT | Mod: 25,S$GLB,, | Performed by: INTERNAL MEDICINE

## 2021-06-22 PROCEDURE — 99214 PR OFFICE/OUTPT VISIT, EST, LEVL IV, 30-39 MIN: ICD-10-PCS | Mod: 25,S$GLB,, | Performed by: INTERNAL MEDICINE

## 2021-06-22 PROCEDURE — 93010 EKG 12-LEAD: ICD-10-PCS | Mod: S$GLB,,, | Performed by: INTERNAL MEDICINE

## 2021-06-22 RX ORDER — EZETIMIBE 10 MG/1
10 TABLET ORAL DAILY
Qty: 90 TABLET | Refills: 3 | Status: SHIPPED | OUTPATIENT
Start: 2021-06-22 | End: 2022-03-21 | Stop reason: SDUPTHER

## 2021-06-28 DIAGNOSIS — E78.00 HYPERCHOLESTEROLEMIA: ICD-10-CM

## 2021-06-28 DIAGNOSIS — I48.0 PAROXYSMAL ATRIAL FIBRILLATION: ICD-10-CM

## 2021-06-28 DIAGNOSIS — Z79.01 CHRONIC ANTICOAGULATION: ICD-10-CM

## 2021-06-28 DIAGNOSIS — I10 ESSENTIAL HYPERTENSION: ICD-10-CM

## 2021-06-28 RX ORDER — HYDROCHLOROTHIAZIDE 12.5 MG/1
12.5 CAPSULE ORAL DAILY
Qty: 90 CAPSULE | Refills: 3 | Status: SHIPPED | OUTPATIENT
Start: 2021-06-28 | End: 2021-07-27

## 2021-06-28 RX ORDER — SPIRONOLACTONE 25 MG/1
25 TABLET ORAL DAILY
Qty: 90 TABLET | Refills: 3 | Status: SHIPPED | OUTPATIENT
Start: 2021-06-28 | End: 2022-03-21 | Stop reason: SDUPTHER

## 2021-06-28 RX ORDER — ATORVASTATIN CALCIUM 80 MG/1
80 TABLET, FILM COATED ORAL DAILY
Qty: 90 TABLET | Refills: 3 | Status: SHIPPED | OUTPATIENT
Start: 2021-06-28 | End: 2022-03-21 | Stop reason: SDUPTHER

## 2021-06-28 RX ORDER — METOPROLOL TARTRATE 25 MG/1
25 TABLET, FILM COATED ORAL 2 TIMES DAILY
Qty: 180 TABLET | Refills: 3 | Status: SHIPPED | OUTPATIENT
Start: 2021-06-28 | End: 2022-03-21 | Stop reason: SDUPTHER

## 2021-07-06 ENCOUNTER — HOSPITAL ENCOUNTER (OUTPATIENT)
Dept: CARDIOLOGY | Facility: OTHER | Age: 81
Discharge: HOME | End: 2021-07-06
Attending: INTERNAL MEDICINE
Payer: MEDICARE

## 2021-07-06 DIAGNOSIS — I71.40 ABDOMINAL AORTIC ANEURYSM (AAA) WITHOUT RUPTURE: ICD-10-CM

## 2021-07-06 LAB
ABDOMINAL INFRARENAL AORTA AP: 2.9 CM
ABDOMINAL INFRARENAL AORTA ED VEL: 14 CM/S
ABDOMINAL INFRARENAL AORTA PS VEL: 55 CM/S
ABDOMINAL INFRARENAL AORTA TRANS: 2.8 CM
ABDOMINAL JUXTARENAL AORTA AP: 2.6 CM
ABDOMINAL JUXTARENAL AORTA ED VEL: 15 CM/S
ABDOMINAL JUXTARENAL AORTA PS VEL: 43 CM/S
ABDOMINAL JUXTARENAL AORTA TRANS: 2.2 CM
ABDOMINAL LT COM ILIAC AP: 1.2 CM
ABDOMINAL LT COM ILIAC TRANS: 1.4 CM
ABDOMINAL LT COM ILIAC VEL: 348 CM/S
ABDOMINAL LT COM ILLIAC ED VEL: 26 CM/S
ABDOMINAL RT COM ILIAC AP: 1.2 CM
ABDOMINAL RT COM ILIAC TRANS: 1.4 CM
ABDOMINAL RT COM ILIAC VEL: 130 CM/S
ABDOMINAL RT COM ILLIAC ED VEL: 25 CM/S
ABDOMINAL SUPRARENAL AORTA AP: 2.5 CM
ABDOMINAL SUPRARENAL AORTA ED VEL: 11 CM/S
ABDOMINAL SUPRARENAL AORTA PS VEL: 54 CM/S
ABDOMINAL SUPRARENAL AORTA TRANS: 2.3 CM
OHS CV AAA LARGEST SIZE: 2.9 CM
OHS CV US ABDOMINAL AORTA PSV HIGHEST VALUE: 348 CM/S

## 2021-07-06 PROCEDURE — 76706 US ABDL AORTA SCREEN AAA: CPT | Mod: 26,,, | Performed by: INTERNAL MEDICINE

## 2021-07-06 PROCEDURE — 76706 US ABDL AORTA SCREEN AAA: CPT

## 2021-07-06 PROCEDURE — 76706 CV US AAA SCREENING (CUPID ONLY): ICD-10-PCS | Mod: 26,,, | Performed by: INTERNAL MEDICINE

## 2021-10-27 ENCOUNTER — OFFICE VISIT (OUTPATIENT)
Dept: CARDIOLOGY | Facility: CLINIC | Age: 81
End: 2021-10-27
Attending: INTERNAL MEDICINE
Payer: MEDICARE

## 2021-10-27 VITALS
SYSTOLIC BLOOD PRESSURE: 125 MMHG | WEIGHT: 240 LBS | BODY MASS INDEX: 36.49 KG/M2 | DIASTOLIC BLOOD PRESSURE: 75 MMHG | HEART RATE: 60 BPM

## 2021-10-27 DIAGNOSIS — E78.00 HYPERCHOLESTEROLEMIA: ICD-10-CM

## 2021-10-27 DIAGNOSIS — I71.40 ABDOMINAL AORTIC ANEURYSM (AAA) WITHOUT RUPTURE: ICD-10-CM

## 2021-10-27 DIAGNOSIS — E66.01 SEVERE OBESITY: ICD-10-CM

## 2021-10-27 DIAGNOSIS — I48.0 PAROXYSMAL ATRIAL FIBRILLATION: ICD-10-CM

## 2021-10-27 DIAGNOSIS — Z79.01 CHRONIC ANTICOAGULATION: ICD-10-CM

## 2021-10-27 DIAGNOSIS — K21.9 GASTROESOPHAGEAL REFLUX DISEASE WITHOUT ESOPHAGITIS: ICD-10-CM

## 2021-10-27 DIAGNOSIS — I10 PRIMARY HYPERTENSION: ICD-10-CM

## 2021-10-27 DIAGNOSIS — I50.32 HEART FAILURE, DIASTOLIC, CHRONIC: ICD-10-CM

## 2021-10-27 DIAGNOSIS — Z87.898 HISTORY OF CHEST PAIN: ICD-10-CM

## 2021-10-27 DIAGNOSIS — Z85.3 HISTORY OF BREAST CANCER: ICD-10-CM

## 2021-10-27 PROCEDURE — 1159F MED LIST DOCD IN RCRD: CPT | Mod: CPTII,S$GLB,, | Performed by: INTERNAL MEDICINE

## 2021-10-27 PROCEDURE — 3078F PR MOST RECENT DIASTOLIC BLOOD PRESSURE < 80 MM HG: ICD-10-PCS | Mod: CPTII,S$GLB,, | Performed by: INTERNAL MEDICINE

## 2021-10-27 PROCEDURE — 1101F PR PT FALLS ASSESS DOC 0-1 FALLS W/OUT INJ PAST YR: ICD-10-PCS | Mod: CPTII,S$GLB,, | Performed by: INTERNAL MEDICINE

## 2021-10-27 PROCEDURE — 1160F RVW MEDS BY RX/DR IN RCRD: CPT | Mod: CPTII,S$GLB,, | Performed by: INTERNAL MEDICINE

## 2021-10-27 PROCEDURE — 1126F PR PAIN SEVERITY QUANTIFIED, NO PAIN PRESENT: ICD-10-PCS | Mod: CPTII,S$GLB,, | Performed by: INTERNAL MEDICINE

## 2021-10-27 PROCEDURE — 1101F PT FALLS ASSESS-DOCD LE1/YR: CPT | Mod: CPTII,S$GLB,, | Performed by: INTERNAL MEDICINE

## 2021-10-27 PROCEDURE — 93000 ELECTROCARDIOGRAM COMPLETE: CPT | Mod: S$GLB,,, | Performed by: INTERNAL MEDICINE

## 2021-10-27 PROCEDURE — 3078F DIAST BP <80 MM HG: CPT | Mod: CPTII,S$GLB,, | Performed by: INTERNAL MEDICINE

## 2021-10-27 PROCEDURE — 93000 PR ELECTROCARDIOGRAM, COMPLETE: ICD-10-PCS | Mod: S$GLB,,, | Performed by: INTERNAL MEDICINE

## 2021-10-27 PROCEDURE — 3074F PR MOST RECENT SYSTOLIC BLOOD PRESSURE < 130 MM HG: ICD-10-PCS | Mod: CPTII,S$GLB,, | Performed by: INTERNAL MEDICINE

## 2021-10-27 PROCEDURE — 99214 OFFICE O/P EST MOD 30 MIN: CPT | Mod: 25,S$GLB,, | Performed by: INTERNAL MEDICINE

## 2021-10-27 PROCEDURE — 99999 PR PBB SHADOW E&M-EST. PATIENT-LVL III: ICD-10-PCS | Mod: PBBFAC,,, | Performed by: INTERNAL MEDICINE

## 2021-10-27 PROCEDURE — 93005 ELECTROCARDIOGRAM TRACING: CPT

## 2021-10-27 PROCEDURE — 1160F PR REVIEW ALL MEDS BY PRESCRIBER/CLIN PHARMACIST DOCUMENTED: ICD-10-PCS | Mod: CPTII,S$GLB,, | Performed by: INTERNAL MEDICINE

## 2021-10-27 PROCEDURE — 1159F PR MEDICATION LIST DOCUMENTED IN MEDICAL RECORD: ICD-10-PCS | Mod: CPTII,S$GLB,, | Performed by: INTERNAL MEDICINE

## 2021-10-27 PROCEDURE — 99214 PR OFFICE/OUTPT VISIT, EST, LEVL IV, 30-39 MIN: ICD-10-PCS | Mod: 25,S$GLB,, | Performed by: INTERNAL MEDICINE

## 2021-10-27 PROCEDURE — 3288F FALL RISK ASSESSMENT DOCD: CPT | Mod: CPTII,S$GLB,, | Performed by: INTERNAL MEDICINE

## 2021-10-27 PROCEDURE — 3288F PR FALLS RISK ASSESSMENT DOCUMENTED: ICD-10-PCS | Mod: CPTII,S$GLB,, | Performed by: INTERNAL MEDICINE

## 2021-10-27 PROCEDURE — 3074F SYST BP LT 130 MM HG: CPT | Mod: CPTII,S$GLB,, | Performed by: INTERNAL MEDICINE

## 2021-10-27 PROCEDURE — 1126F AMNT PAIN NOTED NONE PRSNT: CPT | Mod: CPTII,S$GLB,, | Performed by: INTERNAL MEDICINE

## 2021-10-27 PROCEDURE — 99999 PR PBB SHADOW E&M-EST. PATIENT-LVL III: CPT | Mod: PBBFAC,,, | Performed by: INTERNAL MEDICINE

## 2022-03-21 ENCOUNTER — OFFICE VISIT (OUTPATIENT)
Dept: CARDIOLOGY | Facility: CLINIC | Age: 82
End: 2022-03-21
Attending: INTERNAL MEDICINE
Payer: MEDICARE

## 2022-03-21 VITALS
OXYGEN SATURATION: 96 % | BODY MASS INDEX: 36.42 KG/M2 | WEIGHT: 240.31 LBS | HEIGHT: 68 IN | HEART RATE: 92 BPM | SYSTOLIC BLOOD PRESSURE: 110 MMHG | DIASTOLIC BLOOD PRESSURE: 78 MMHG

## 2022-03-21 DIAGNOSIS — E66.01 SEVERE OBESITY: ICD-10-CM

## 2022-03-21 DIAGNOSIS — I71.40 ABDOMINAL AORTIC ANEURYSM (AAA) WITHOUT RUPTURE: ICD-10-CM

## 2022-03-21 DIAGNOSIS — I10 ESSENTIAL HYPERTENSION: ICD-10-CM

## 2022-03-21 DIAGNOSIS — I10 PRIMARY HYPERTENSION: ICD-10-CM

## 2022-03-21 DIAGNOSIS — Z85.3 HISTORY OF BREAST CANCER: ICD-10-CM

## 2022-03-21 DIAGNOSIS — E78.00 HYPERCHOLESTEROLEMIA: ICD-10-CM

## 2022-03-21 DIAGNOSIS — I50.32 HEART FAILURE, DIASTOLIC, CHRONIC: ICD-10-CM

## 2022-03-21 DIAGNOSIS — Z87.898 HISTORY OF CHEST PAIN: ICD-10-CM

## 2022-03-21 DIAGNOSIS — Z79.01 CHRONIC ANTICOAGULATION: ICD-10-CM

## 2022-03-21 DIAGNOSIS — K21.9 GASTROESOPHAGEAL REFLUX DISEASE WITHOUT ESOPHAGITIS: ICD-10-CM

## 2022-03-21 DIAGNOSIS — I48.0 PAROXYSMAL ATRIAL FIBRILLATION: ICD-10-CM

## 2022-03-21 PROCEDURE — 1159F MED LIST DOCD IN RCRD: CPT | Mod: CPTII,S$GLB,, | Performed by: INTERNAL MEDICINE

## 2022-03-21 PROCEDURE — 3074F SYST BP LT 130 MM HG: CPT | Mod: CPTII,S$GLB,, | Performed by: INTERNAL MEDICINE

## 2022-03-21 PROCEDURE — 3078F PR MOST RECENT DIASTOLIC BLOOD PRESSURE < 80 MM HG: ICD-10-PCS | Mod: CPTII,S$GLB,, | Performed by: INTERNAL MEDICINE

## 2022-03-21 PROCEDURE — 1160F RVW MEDS BY RX/DR IN RCRD: CPT | Mod: CPTII,S$GLB,, | Performed by: INTERNAL MEDICINE

## 2022-03-21 PROCEDURE — 99999 PR PBB SHADOW E&M-EST. PATIENT-LVL III: ICD-10-PCS | Mod: PBBFAC,,, | Performed by: INTERNAL MEDICINE

## 2022-03-21 PROCEDURE — 1160F PR REVIEW ALL MEDS BY PRESCRIBER/CLIN PHARMACIST DOCUMENTED: ICD-10-PCS | Mod: CPTII,S$GLB,, | Performed by: INTERNAL MEDICINE

## 2022-03-21 PROCEDURE — 3078F DIAST BP <80 MM HG: CPT | Mod: CPTII,S$GLB,, | Performed by: INTERNAL MEDICINE

## 2022-03-21 PROCEDURE — 1159F PR MEDICATION LIST DOCUMENTED IN MEDICAL RECORD: ICD-10-PCS | Mod: CPTII,S$GLB,, | Performed by: INTERNAL MEDICINE

## 2022-03-21 PROCEDURE — 99214 OFFICE O/P EST MOD 30 MIN: CPT | Mod: S$GLB,,, | Performed by: INTERNAL MEDICINE

## 2022-03-21 PROCEDURE — 3074F PR MOST RECENT SYSTOLIC BLOOD PRESSURE < 130 MM HG: ICD-10-PCS | Mod: CPTII,S$GLB,, | Performed by: INTERNAL MEDICINE

## 2022-03-21 PROCEDURE — 99999 PR PBB SHADOW E&M-EST. PATIENT-LVL III: CPT | Mod: PBBFAC,,, | Performed by: INTERNAL MEDICINE

## 2022-03-21 PROCEDURE — 99214 PR OFFICE/OUTPT VISIT, EST, LEVL IV, 30-39 MIN: ICD-10-PCS | Mod: S$GLB,,, | Performed by: INTERNAL MEDICINE

## 2022-03-21 RX ORDER — HYDROCHLOROTHIAZIDE 12.5 MG/1
12.5 CAPSULE ORAL DAILY
Qty: 90 CAPSULE | Refills: 3 | Status: SHIPPED | OUTPATIENT
Start: 2022-03-21 | End: 2022-08-08

## 2022-03-21 RX ORDER — LISINOPRIL 10 MG/1
10 TABLET ORAL DAILY
Qty: 90 TABLET | Refills: 3 | Status: SHIPPED | OUTPATIENT
Start: 2022-03-21 | End: 2023-02-16 | Stop reason: SDUPTHER

## 2022-03-21 RX ORDER — SPIRONOLACTONE 25 MG/1
25 TABLET ORAL DAILY
Qty: 90 TABLET | Refills: 3 | Status: SHIPPED | OUTPATIENT
Start: 2022-03-21 | End: 2023-02-16 | Stop reason: SDUPTHER

## 2022-03-21 RX ORDER — ATORVASTATIN CALCIUM 80 MG/1
80 TABLET, FILM COATED ORAL DAILY
Qty: 90 TABLET | Refills: 3 | Status: SHIPPED | OUTPATIENT
Start: 2022-03-21 | End: 2023-02-16 | Stop reason: SDUPTHER

## 2022-03-21 RX ORDER — METOPROLOL TARTRATE 25 MG/1
25 TABLET, FILM COATED ORAL 2 TIMES DAILY
Qty: 180 TABLET | Refills: 3 | Status: SHIPPED | OUTPATIENT
Start: 2022-03-21 | End: 2023-02-16

## 2022-03-21 RX ORDER — EZETIMIBE 10 MG/1
10 TABLET ORAL DAILY
Qty: 90 TABLET | Refills: 3 | Status: SHIPPED | OUTPATIENT
Start: 2022-03-21 | End: 2022-11-07

## 2022-03-21 NOTE — PROGRESS NOTES
Subjective:     Shanice Grewal is a 82 y.o. female with hypertension and hypercholesterolemia. In 2019 she was told she may have prediabetes. She is severely obese. She has hypertensive heart disease with moderate diastolic dysfunction. She had an episode of chest pain when shopping on 11/14/2013. An ambulance was called for and she was checked at her home. She was advised to follow up with her doctor as an outpatient. She underwent a stress echocardiogram in 11/2013 that was fine. She was diagnosed with esophageal reflux disease in 4/2014. After she began a proton pump inhibitor and sucralfate her epigastric pain resolved. In 5/2015 she was seen in the emergency room at [facility] for atypical chest pain. She underwent a stress echocardiogram in 6/2015 during which she was able to exercise for 6:00 minutes. There was no chest pain and the electrocardiogram was negative. The echocardiogram was negative as well except for a mildly sigmoid septum. The chest pain later resolved. In 5/2018 she had gynecological surgery. On 9/9/2019 she had onset of palpitations and was diagnosed with atrial fibrillation. On 9/10/2019 she underwent electrical cardioversion. She has a [id number] score of 6 ([id number]) and was begun on apixiban. In 11/2019 she was diagnosed with breast cancer of the right breast. She underwent right mastectomy on 12/5/2019. In 3/2022 she has been bothered by an upper respiratory in infection and has had occasional wheezing. She has inhalers. No exertional chest pain but mild exertional dyspnea. No palpitations or weak spells. She continues to occasionally experience  a warm feeling in the right calf that can come on at essentially any time and does not appear related to activities. No bleeding. No issues with any of her prescribed medications. Feeling well overall.      Congestive Heart Failure  Presents for follow-up visit. The disease course has been stable. Pertinent negatives include no abdominal  pain, chest pain, chest pressure, claudication, edema, fatigue, muscle weakness, near-syncope, nocturia, orthopnea, palpitations, paroxysmal nocturnal dyspnea, shortness of breath or unexpected weight change. The symptoms have been stable.   Chest Pain   This is a chronic problem. The current episode started more than 1 year ago. The problem has been resolved. Pertinent negatives include no abdominal pain, back pain, claudication, cough, diaphoresis, dizziness, exertional chest pressure, fever, headaches, hemoptysis, irregular heartbeat, leg pain, lower extremity edema, malaise/fatigue, nausea, near-syncope, numbness, orthopnea, palpitations, PND, shortness of breath, sputum production, syncope, vomiting or weakness. The pain is aggravated by nothing.   Her past medical history is significant for CHF, hyperlipidemia and hypertension.   Pertinent negatives for past medical history include no diabetes and no muscle weakness.   Atrial Fibrillation  Presents for follow-up visit. Symptoms include hypertension. Symptoms are negative for bradycardia, chest pain, dizziness, hypotension, palpitations, shortness of breath, syncope, tachycardia and weakness. The symptoms have been stable. Past medical history includes atrial fibrillation, CHF and hyperlipidemia.   Hypertension  This is a chronic problem. The current episode started more than 1 year ago. The problem is unchanged. The problem is controlled (usually 120-130/70-80 mmHg at home). Pertinent negatives include no anxiety, blurred vision, chest pain, headaches, malaise/fatigue, neck pain, orthopnea, palpitations, peripheral edema, PND, shortness of breath or sweats. There is no history of chronic renal disease.   Hyperlipidemia  This is a chronic problem. The current episode started more than 1 year ago. The problem is controlled. Recent lipid tests were reviewed and are normal. Exacerbating diseases include obesity. She has no history of chronic renal disease,  diabetes, hypothyroidism, liver disease or nephrotic syndrome. Pertinent negatives include no chest pain, focal sensory loss, focal weakness, leg pain, myalgias or shortness of breath.       Review of Systems   Constitutional: Negative for diaphoresis, fatigue, fever, malaise/fatigue and unexpected weight change.   HENT: Negative for nosebleeds.    Eyes: Negative for blurred vision, pain, vision loss in left eye and vision loss in right eye.   Cardiovascular: Positive for dyspnea on exertion. Negative for chest pain, claudication, irregular heartbeat, leg swelling, near-syncope, orthopnea, palpitations, paroxysmal nocturnal dyspnea and syncope.   Respiratory: Negative for cough, hemoptysis, shortness of breath, sputum production and wheezing.    Endocrine: Negative for cold intolerance, heat intolerance and polydipsia.   Hematologic/Lymphatic: Negative for bleeding problem. Does not bruise/bleed easily.   Skin: Negative for color change and rash.   Musculoskeletal: Positive for arthritis. Negative for back pain, falls, joint pain, muscle weakness, myalgias and neck pain.   Gastrointestinal: Negative for abdominal pain, heartburn, hematemesis, hematochezia, hemorrhoids, jaundice, melena, nausea and vomiting.   Genitourinary: Negative for dysuria, hematuria and nocturia.   Neurological: Negative for dizziness, focal weakness, headaches, light-headedness, loss of balance, numbness, tremors, vertigo and weakness.   Psychiatric/Behavioral: Negative for altered mental status, depression and memory loss. The patient is not nervous/anxious.    Allergic/Immunologic: Negative for hives and persistent infections.       Current Outpatient Medications on File Prior to Visit   Medication Sig Dispense Refill    apixaban (ELIQUIS) 5 mg Tab Take 1 tablet (5 mg total) by mouth 2 (two) times daily. 180 tablet 3    atorvastatin (LIPITOR) 80 MG tablet Take 1 tablet (80 mg total) by mouth once daily. 90 tablet 3    azelastine  "(ASTELIN) 137 mcg (0.1 %) nasal spray 1 spray by Nasal route 2 (two) times daily.      ergocalciferol (ERGOCALCIFEROL) 50,000 unit Cap Take 50,000 Units by mouth every 7 days.      ezetimibe (ZETIA) 10 mg tablet Take 1 tablet (10 mg total) by mouth once daily. 90 tablet 3    fluticasone (FLONASE) 50 mcg/actuation nasal spray       gabapentin (NEURONTIN) 100 MG capsule Take 100 mg by mouth 3 (three) times daily.      hydroCHLOROthiazide (MICROZIDE) 12.5 mg capsule TAKE 1 CAPSULE(12.5 MG) BY MOUTH EVERY DAY 90 capsule 3    lisinopriL 10 MG tablet Take 1 tablet (10 mg total) by mouth once daily. 90 tablet 3    LORazepam (ATIVAN) 1 MG tablet Take 1 mg by mouth every 12 (twelve) hours as needed for Anxiety.      metoprolol tartrate (LOPRESSOR) 25 MG tablet Take 1 tablet (25 mg total) by mouth 2 (two) times daily. 180 tablet 3    omega-3 fatty acids/fish oil (FISH OIL-OMEGA-3 FATTY ACIDS) 300-1,000 mg capsule Take 1 capsule by mouth once daily. 90 capsule 3    omeprazole (PRILOSEC) 20 MG capsule Take 20 mg by mouth once daily.      spironolactone (ALDACTONE) 25 MG tablet Take 1 tablet (25 mg total) by mouth once daily. 90 tablet 3    FLUVIRIN [id number] 45 mcg (15 mcg x 3)/0.5 mL Susp       metoclopramide HCl (REGLAN) 10 MG tablet       PATANOL 0.1 % ophthalmic solution       sucralfate (CARAFATE) 1 gram tablet Take 1 g by mouth 4 (four) times daily before meals and nightly.        No current facility-administered medications on file prior to visit.       /78 (BP Location: Left arm, Patient Position: Sitting)   Pulse 92   Ht 5' 8" (1.727 m)   Wt 109 kg (240 lb 4.8 oz)   SpO2 96%   BMI 36.54 kg/m²       Objective:     Physical Exam  Constitutional:       General: She is not in acute distress.     Appearance: Normal appearance. She is well-developed. She is not ill-appearing or diaphoretic.   HENT:      Head: Normocephalic and atraumatic.      Nose: Nose normal.   Eyes:      General:         Right " eye: No discharge.         Left eye: No discharge.      Conjunctiva/sclera:      Right eye: No hemorrhage.     Left eye: No hemorrhage.     Pupils: Pupils are equal.      Right eye: Pupil is round.      Left eye: Pupil is round.   Neck:      Thyroid: No thyromegaly.      Vascular: No carotid bruit or JVD.   Cardiovascular:      Rate and Rhythm: Normal rate and regular rhythm.  No extrasystoles are present.     Chest Wall: PMI is not displaced.      Pulses:           Radial pulses are 2+ on the right side and 2+ on the left side.        Dorsalis pedis pulses are 1+ on the right side and 1+ on the left side.        Posterior tibial pulses are 1+ on the right side and 1+ on the left side.      Heart sounds: S1 normal and S2 normal. Murmur heard.    Harsh midsystolic murmur is present with a grade of 2/6 at the upper right sternal border.    Gallop present. S4 sounds present. No S3 sounds.   Pulmonary:      Effort: Pulmonary effort is normal.      Breath sounds: Normal breath sounds.   Abdominal:      Palpations: Abdomen is soft.      Tenderness: There is no abdominal tenderness.   Musculoskeletal:      Cervical back: Neck supple.      Right ankle: No swelling, deformity or ecchymosis.      Left ankle: No swelling, deformity or ecchymosis.   Lymphadenopathy:      Head:      Right side of head: No submandibular adenopathy.      Left side of head: No submandibular adenopathy.      Cervical: No cervical adenopathy.   Skin:     General: Skin is warm.      Findings: No rash.   Neurological:      General: No focal deficit present.      Mental Status: She is alert and oriented to person, place, and time. She is not disoriented.      Cranial Nerves: No cranial nerve deficit.   Psychiatric:         Attention and Perception: Attention and perception normal.         Mood and Affect: Mood and affect normal.         Speech: Speech normal.         Behavior: Behavior normal.         Thought Content: Thought content normal.          Cognition and Memory: Cognition and memory normal.         Judgment: Judgment normal.         Assessment:      1. Heart failure, diastolic, chronic    2. History of chest pain    3. Paroxysmal atrial fibrillation    4. Chronic anticoagulation    5. Abdominal aortic aneurysm (AAA) without rupture    6. Primary hypertension    7. Hypercholesterolemia    8. Severe obesity    9. Gastroesophageal reflux disease without esophagitis    10. History of breast cancer        Plan:     1. Heart Failure, Diastolic, Chronic   2/21/2011: Echo: Moderate left ventricular hypertrophy. Moderate diastolic dysfunction. Mild to moderate mitral regurgitation.   On metoprolol 25 mg Q12, lisinopril 10 mg Q24, spironolactone 25 mg Q24 and hctz 12.5 mg Q24.   Well compensated.    2. History of Chest Pain   11/15/2013: ECG: NSR. Left axis deviation.   11/25/2013: Stress Echo: 6:30 min. Negative.   5/19/2015: Seen at [facility] ER for atypical chest pain.   6/2/2015: Stress Echo: 6:00 min. No CP. ECG negative. Echo negative. Mildly sigmoid septum.   3/15/2016: Recurrent atypical chest pain.   Appeared chest pain was non cardiac in origin.   Resolved.   Reassurance.    3. Atrial Fibrillation   9/9/2019: Diagnosed with paroxysmal atrial fibrillation.   [id number] ([id number]).   9/10/2019: CV.   On apixiban.   On metoprolol 25 mg Q12.   No clinical recurrence.     4. Chronic Anticoagulation   9/9/2019: Diagnosed with paroxysmal atrial fibrillation.   [id number] ([id number]).   On apixiban 5 mg Q12.   No aspirin or NSAID.   No bleeding.    5. Abdominal Aortic Aneurysm   1/24/2018: CT abdomen: Small AAA 3.1 cm.   1/2/2019: U/S AAA: Small AAA 2.5 cm x 3.0 cm.   6/22/2020: U/S AAA: Small AAA: 3.0 cm x 3.1 cm.   7/6/2021: U/S AAA: Small AAA: 2.8 x 2.9 cm. LCIA: Severe stenosis: 3.5 m/s.   She has no claudication.   7/2022: Plan next U/S AAA. Then yearly.     6. Hypertension   2000: Diagnosed.   On metoprolol 25 mg Q12, lisinopril 10 mg Q24, spironolactone  25 mg Q24 and hctz 12.5 mg Q24.   Keeping log at home.    Well controlled.    7. Hypercholesterolemia   2005: Began statin.   On atorvastatin 80 mg Q24 and ezetimide 10 mg Q24.   Tells me well controlled.     8. Severe Obesity   4/10/2017: Weight 98 kg. BMI 32.   10/23/2020: Weight 106 kg. BMI 36.   2/26/2021: Weight 107 kg. BMI 36.   3/21/2022: Weight 109 kg. BMI 37.   Encouraged to lose weight.    9. Gastroesophageal Reflux Disease    4/2014: Diagnosed.   On omeprazole 20 mg Q24.   Seldom bothers her.    10. History of Breast Cancer   11/2019: Diagnosed. Right breast.   12/5/2019: Right mastectomy.   Dr. Kennedy John, III..   Dr. Erich Albrecht.    11. Bronchitis   3/2022: Has had UTI.   On inhalers.   Dr. Dru Nam.     12. Primary Care   Dr. Magdiel Deutsch.    F/u 4 months.    Mira Mcknight M.D.        7/6/2022 4:17 PM, Addendum:    7/6/2022: U/S AAA: Small AAA: 2.6 x 3.2 cm. RCIA: Severe stenosis - 2.7 m/s. LCIA: Severe stenosis - 3.5 m/s.    I discussed the above test result and the implications of the findings over the phone.    Mira Mcknight M.D.

## 2022-07-06 ENCOUNTER — HOSPITAL ENCOUNTER (OUTPATIENT)
Dept: CARDIOLOGY | Facility: OTHER | Age: 82
Discharge: HOME | End: 2022-07-06
Attending: INTERNAL MEDICINE
Payer: MEDICARE

## 2022-07-06 DIAGNOSIS — I71.40 ABDOMINAL AORTIC ANEURYSM (AAA) WITHOUT RUPTURE: ICD-10-CM

## 2022-07-06 LAB
ABDOMINAL INFRARENAL AORTA AP: 1.4 CM
ABDOMINAL INFRARENAL AORTA ED VEL: 10 CM/S
ABDOMINAL INFRARENAL AORTA PS VEL: 55 CM/S
ABDOMINAL INFRARENAL AORTA TRANS: 1.6 CM
ABDOMINAL JUXTARENAL AORTA AP: 2.5 CM
ABDOMINAL JUXTARENAL AORTA ED VEL: 0 CM/S
ABDOMINAL JUXTARENAL AORTA PS VEL: 41 CM/S
ABDOMINAL JUXTARENAL AORTA TRANS: 2.7 CM
ABDOMINAL LT COM ILIAC AP: 0.7 CM
ABDOMINAL LT COM ILIAC TRANS: 0.9 CM
ABDOMINAL LT COM ILIAC VEL: 362 CM/S
ABDOMINAL LT COM ILLIAC ED VEL: 17 CM/S
ABDOMINAL RT COM ILIAC AP: 0.8 CM
ABDOMINAL RT COM ILIAC TRANS: 1.6 CM
ABDOMINAL RT COM ILIAC VEL: 269 CM/S
ABDOMINAL RT COM ILLIAC ED VEL: 22 CM/S
ABDOMINAL SUPRARENAL AORTA AP: 2.6 CM
ABDOMINAL SUPRARENAL AORTA ED VEL: 10 CM/S
ABDOMINAL SUPRARENAL AORTA PS VEL: 37 CM/S
ABDOMINAL SUPRARENAL AORTA TRANS: 3.2 CM
OHS CV AAA LARGEST SIZE: 3.2 CM
OHS CV US ABDOMINAL AORTA PSV HIGHEST VALUE: 3.6 CM/S

## 2022-07-06 PROCEDURE — 76706 US ABDL AORTA SCREEN AAA: CPT

## 2022-07-06 PROCEDURE — 76706 CV US AAA SCREENING (CUPID ONLY): ICD-10-PCS | Mod: 26,,, | Performed by: INTERNAL MEDICINE

## 2022-07-06 PROCEDURE — 76706 US ABDL AORTA SCREEN AAA: CPT | Mod: 26,,, | Performed by: INTERNAL MEDICINE

## 2022-08-05 ENCOUNTER — OFFICE VISIT (OUTPATIENT)
Dept: CARDIOLOGY | Facility: CLINIC | Age: 82
End: 2022-08-05
Attending: INTERNAL MEDICINE
Payer: MEDICARE

## 2022-08-05 VITALS
HEIGHT: 68 IN | OXYGEN SATURATION: 95 % | WEIGHT: 230.69 LBS | DIASTOLIC BLOOD PRESSURE: 78 MMHG | BODY MASS INDEX: 34.96 KG/M2 | SYSTOLIC BLOOD PRESSURE: 116 MMHG | HEART RATE: 79 BPM

## 2022-08-05 DIAGNOSIS — I50.32 HEART FAILURE, DIASTOLIC, CHRONIC: ICD-10-CM

## 2022-08-05 DIAGNOSIS — I71.40 ABDOMINAL AORTIC ANEURYSM (AAA) WITHOUT RUPTURE: ICD-10-CM

## 2022-08-05 DIAGNOSIS — K21.9 GASTROESOPHAGEAL REFLUX DISEASE WITHOUT ESOPHAGITIS: ICD-10-CM

## 2022-08-05 DIAGNOSIS — E66.01 SEVERE OBESITY: ICD-10-CM

## 2022-08-05 DIAGNOSIS — Z87.898 HISTORY OF CHEST PAIN: ICD-10-CM

## 2022-08-05 DIAGNOSIS — Z79.01 CHRONIC ANTICOAGULATION: ICD-10-CM

## 2022-08-05 DIAGNOSIS — I48.11 LONGSTANDING PERSISTENT ATRIAL FIBRILLATION: ICD-10-CM

## 2022-08-05 DIAGNOSIS — E78.00 HYPERCHOLESTEROLEMIA: ICD-10-CM

## 2022-08-05 DIAGNOSIS — I10 PRIMARY HYPERTENSION: ICD-10-CM

## 2022-08-05 DIAGNOSIS — Z85.3 HISTORY OF BREAST CANCER: ICD-10-CM

## 2022-08-05 PROCEDURE — 99999 PR PBB SHADOW E&M-EST. PATIENT-LVL III: ICD-10-PCS | Mod: PBBFAC,,, | Performed by: INTERNAL MEDICINE

## 2022-08-05 PROCEDURE — 1160F RVW MEDS BY RX/DR IN RCRD: CPT | Mod: CPTII,S$GLB,, | Performed by: INTERNAL MEDICINE

## 2022-08-05 PROCEDURE — 93000 PR ELECTROCARDIOGRAM, COMPLETE: ICD-10-PCS | Mod: S$GLB,,, | Performed by: INTERNAL MEDICINE

## 2022-08-05 PROCEDURE — 3288F PR FALLS RISK ASSESSMENT DOCUMENTED: ICD-10-PCS | Mod: CPTII,S$GLB,, | Performed by: INTERNAL MEDICINE

## 2022-08-05 PROCEDURE — 99999 PR PBB SHADOW E&M-EST. PATIENT-LVL III: CPT | Mod: PBBFAC,,, | Performed by: INTERNAL MEDICINE

## 2022-08-05 PROCEDURE — 1101F PT FALLS ASSESS-DOCD LE1/YR: CPT | Mod: CPTII,S$GLB,, | Performed by: INTERNAL MEDICINE

## 2022-08-05 PROCEDURE — 99214 PR OFFICE/OUTPT VISIT, EST, LEVL IV, 30-39 MIN: ICD-10-PCS | Mod: 25,S$GLB,, | Performed by: INTERNAL MEDICINE

## 2022-08-05 PROCEDURE — 3078F DIAST BP <80 MM HG: CPT | Mod: CPTII,S$GLB,, | Performed by: INTERNAL MEDICINE

## 2022-08-05 PROCEDURE — 1160F PR REVIEW ALL MEDS BY PRESCRIBER/CLIN PHARMACIST DOCUMENTED: ICD-10-PCS | Mod: CPTII,S$GLB,, | Performed by: INTERNAL MEDICINE

## 2022-08-05 PROCEDURE — 1126F AMNT PAIN NOTED NONE PRSNT: CPT | Mod: CPTII,S$GLB,, | Performed by: INTERNAL MEDICINE

## 2022-08-05 PROCEDURE — 1159F MED LIST DOCD IN RCRD: CPT | Mod: CPTII,S$GLB,, | Performed by: INTERNAL MEDICINE

## 2022-08-05 PROCEDURE — 3074F PR MOST RECENT SYSTOLIC BLOOD PRESSURE < 130 MM HG: ICD-10-PCS | Mod: CPTII,S$GLB,, | Performed by: INTERNAL MEDICINE

## 2022-08-05 PROCEDURE — 99214 OFFICE O/P EST MOD 30 MIN: CPT | Mod: 25,S$GLB,, | Performed by: INTERNAL MEDICINE

## 2022-08-05 PROCEDURE — 93005 ELECTROCARDIOGRAM TRACING: CPT

## 2022-08-05 PROCEDURE — 1126F PR PAIN SEVERITY QUANTIFIED, NO PAIN PRESENT: ICD-10-PCS | Mod: CPTII,S$GLB,, | Performed by: INTERNAL MEDICINE

## 2022-08-05 PROCEDURE — 3288F FALL RISK ASSESSMENT DOCD: CPT | Mod: CPTII,S$GLB,, | Performed by: INTERNAL MEDICINE

## 2022-08-05 PROCEDURE — 3074F SYST BP LT 130 MM HG: CPT | Mod: CPTII,S$GLB,, | Performed by: INTERNAL MEDICINE

## 2022-08-05 PROCEDURE — 1159F PR MEDICATION LIST DOCUMENTED IN MEDICAL RECORD: ICD-10-PCS | Mod: CPTII,S$GLB,, | Performed by: INTERNAL MEDICINE

## 2022-08-05 PROCEDURE — 3078F PR MOST RECENT DIASTOLIC BLOOD PRESSURE < 80 MM HG: ICD-10-PCS | Mod: CPTII,S$GLB,, | Performed by: INTERNAL MEDICINE

## 2022-08-05 PROCEDURE — 1101F PR PT FALLS ASSESS DOC 0-1 FALLS W/OUT INJ PAST YR: ICD-10-PCS | Mod: CPTII,S$GLB,, | Performed by: INTERNAL MEDICINE

## 2022-08-05 PROCEDURE — 93000 ELECTROCARDIOGRAM COMPLETE: CPT | Mod: S$GLB,,, | Performed by: INTERNAL MEDICINE

## 2022-08-05 NOTE — PROGRESS NOTES
Subjective:     Shanice Grewal is a 82 y.o. female with hypertension and hypercholesterolemia. In 2019 she was told she may have prediabetes. She is severely obese. She has hypertensive heart disease with moderate diastolic dysfunction. She had an episode of chest pain when shopping on 11/14/2013. An ambulance was called for and she was checked at her home. She was advised to follow up with her doctor as an outpatient. She underwent a stress echocardiogram in 11/2013 that was fine. She was diagnosed with esophageal reflux disease in 4/2014. After she began a proton pump inhibitor and sucralfate her epigastric pain resolved. In 5/2015 she was seen in the emergency room at [place] for atypical chest pain. She underwent a stress echocardiogram in 6/2015 during which she was able to exercise for 6:00 minutes. There was no chest pain and the electrocardiogram was negative. The echocardiogram was negative as well except for a mildly sigmoid septum. The chest pain later resolved. In 5/2018 she had gynecological surgery. On 9/9/2019 she had onset of palpitations and was diagnosed with atrial fibrillation. On 9/10/2019 she underwent electrical cardioversion. She has a [id number] score of 6 ([id number]) and was begun on apixiban. In 11/2019 she was diagnosed with breast cancer of the right breast. She underwent right mastectomy on 12/5/2019. In 3/2022 she was bothered by an upper respiratory in infection and has had occasional wheezing. She used inhalers. No exertional chest pain but mild exertional dyspnea. No palpitations or weak spells. She continues to occasionally experience  a warm feeling in the right calf that can come on at essentially any time and does not appear related to activities. No bleeding. No issues with any of her prescribed medications. Feeling well overall.      Congestive Heart Failure  Presents for follow-up visit. The disease course has been stable. Pertinent negatives include no abdominal  pain, chest pain, chest pressure, claudication, edema, fatigue, muscle weakness, near-syncope, nocturia, orthopnea, palpitations, paroxysmal nocturnal dyspnea, shortness of breath or unexpected weight change. The symptoms have been stable.   Chest Pain   This is a chronic problem. The current episode started more than 1 year ago. The problem has been resolved. Pertinent negatives include no abdominal pain, back pain, claudication, cough, diaphoresis, dizziness, exertional chest pressure, fever, headaches, hemoptysis, irregular heartbeat, leg pain, lower extremity edema, malaise/fatigue, nausea, near-syncope, numbness, orthopnea, palpitations, PND, shortness of breath, sputum production, syncope, vomiting or weakness. The pain is aggravated by nothing.   Her past medical history is significant for CHF, hyperlipidemia and hypertension.   Pertinent negatives for past medical history include no diabetes and no muscle weakness.   Atrial Fibrillation  Presents for follow-up visit. Symptoms include hypertension. Symptoms are negative for bradycardia, chest pain, dizziness, hypotension, palpitations, shortness of breath, syncope, tachycardia and weakness. The symptoms have been stable. Past medical history includes atrial fibrillation, CHF and hyperlipidemia.   Hypertension  This is a chronic problem. The current episode started more than 1 year ago. The problem is unchanged. The problem is controlled (usually 120-130/70-80 mmHg at home). Pertinent negatives include no anxiety, blurred vision, chest pain, headaches, malaise/fatigue, neck pain, orthopnea, palpitations, peripheral edema, PND, shortness of breath or sweats. There is no history of chronic renal disease.   Hyperlipidemia  This is a chronic problem. The current episode started more than 1 year ago. The problem is controlled. Recent lipid tests were reviewed and are normal. Exacerbating diseases include obesity. She has no history of chronic renal disease,  diabetes, hypothyroidism, liver disease or nephrotic syndrome. Pertinent negatives include no chest pain, focal sensory loss, focal weakness, leg pain, myalgias or shortness of breath.       Review of Systems   Constitutional: Negative for diaphoresis, fatigue, fever, malaise/fatigue and unexpected weight change.   HENT: Negative for nosebleeds.    Eyes: Negative for blurred vision, pain, vision loss in left eye and vision loss in right eye.   Cardiovascular: Positive for dyspnea on exertion. Negative for chest pain, claudication, irregular heartbeat, leg swelling, near-syncope, orthopnea, palpitations, paroxysmal nocturnal dyspnea and syncope.   Respiratory: Negative for cough, hemoptysis, shortness of breath, sputum production and wheezing.    Endocrine: Negative for cold intolerance, heat intolerance and polydipsia.   Hematologic/Lymphatic: Negative for bleeding problem. Does not bruise/bleed easily.   Skin: Negative for color change and rash.   Musculoskeletal: Positive for arthritis. Negative for back pain, falls, joint pain, muscle weakness, myalgias and neck pain.   Gastrointestinal: Negative for abdominal pain, heartburn, hematemesis, hematochezia, hemorrhoids, jaundice, melena, nausea and vomiting.   Genitourinary: Negative for dysuria, hematuria and nocturia.   Neurological: Negative for dizziness, focal weakness, headaches, light-headedness, loss of balance, numbness, tremors, vertigo and weakness.   Psychiatric/Behavioral: Negative for altered mental status, depression and memory loss. The patient is not nervous/anxious.    Allergic/Immunologic: Negative for hives and persistent infections.       Current Outpatient Medications on File Prior to Visit   Medication Sig Dispense Refill    apixaban (ELIQUIS) 5 mg Tab Take 1 tablet (5 mg total) by mouth 2 (two) times daily. 180 tablet 3    atorvastatin (LIPITOR) 80 MG tablet Take 1 tablet (80 mg total) by mouth once daily. 90 tablet 3    azelastine  "(ASTELIN) 137 mcg (0.1 %) nasal spray 1 spray by Nasal route 2 (two) times daily.      ergocalciferol (ERGOCALCIFEROL) 50,000 unit Cap Take 50,000 Units by mouth every 14 (fourteen) days.      ezetimibe (ZETIA) 10 mg tablet Take 1 tablet (10 mg total) by mouth once daily. 90 tablet 3    fluticasone (FLONASE) 50 mcg/actuation nasal spray       gabapentin (NEURONTIN) 100 MG capsule Take 100 mg by mouth 3 (three) times daily.      hydroCHLOROthiazide (MICROZIDE) 12.5 mg capsule Take 1 capsule (12.5 mg total) by mouth once daily. 90 capsule 3    lisinopriL 10 MG tablet Take 1 tablet (10 mg total) by mouth once daily. 90 tablet 3    LORazepam (ATIVAN) 1 MG tablet Take 1 mg by mouth every 12 (twelve) hours as needed for Anxiety.      metoprolol tartrate (LOPRESSOR) 25 MG tablet Take 1 tablet (25 mg total) by mouth 2 (two) times daily. 180 tablet 3    omeprazole (PRILOSEC) 20 MG capsule Take 20 mg by mouth once daily.      PATANOL 0.1 % ophthalmic solution       spironolactone (ALDACTONE) 25 MG tablet Take 1 tablet (25 mg total) by mouth once daily. 90 tablet 3    FLUVIRIN [id number] 45 mcg (15 mcg x 3)/0.5 mL Susp       metoclopramide HCl (REGLAN) 10 MG tablet       omega-3 fatty acids/fish oil (FISH OIL-OMEGA-3 FATTY ACIDS) 300-1,000 mg capsule Take 1 capsule by mouth once daily. (Patient not taking: Reported on 8/5/2022) 90 capsule 3    sucralfate (CARAFATE) 1 gram tablet Take 1 g by mouth 4 (four) times daily before meals and nightly.        No current facility-administered medications on file prior to visit.       /78 (BP Location: Right arm, Patient Position: Sitting, BP Method: Large (Manual))   Pulse 79   Ht 5' 8" (1.727 m)   Wt 104.6 kg (230 lb 11.4 oz)   SpO2 95%   BMI 35.08 kg/m²       Objective:     Physical Exam  Constitutional:       General: She is not in acute distress.     Appearance: Normal appearance. She is well-developed. She is not ill-appearing or diaphoretic.   NAHUN:      " Head: Normocephalic and atraumatic.      Nose: Nose normal.   Eyes:      General:         Right eye: No discharge.         Left eye: No discharge.      Conjunctiva/sclera:      Right eye: No hemorrhage.     Left eye: No hemorrhage.     Pupils: Pupils are equal.      Right eye: Pupil is round.      Left eye: Pupil is round.   Neck:      Thyroid: No thyromegaly.      Vascular: No carotid bruit or JVD.   Cardiovascular:      Rate and Rhythm: Normal rate. Rhythm irregularly irregular.  No extrasystoles are present.     Chest Wall: PMI is not displaced.      Pulses:           Radial pulses are 2+ on the right side and 2+ on the left side.        Dorsalis pedis pulses are 1+ on the right side and 1+ on the left side.        Posterior tibial pulses are 1+ on the right side and 1+ on the left side.      Heart sounds: S1 normal and S2 normal. Murmur heard.    Harsh midsystolic murmur is present with a grade of 2/6 at the upper right sternal border.  Pulmonary:      Effort: Pulmonary effort is normal.      Breath sounds: Normal breath sounds.   Abdominal:      Palpations: Abdomen is soft.      Tenderness: There is no abdominal tenderness.   Musculoskeletal:      Cervical back: Neck supple.      Right ankle: No swelling, deformity or ecchymosis.      Left ankle: No swelling, deformity or ecchymosis.   Lymphadenopathy:      Head:      Right side of head: No submandibular adenopathy.      Left side of head: No submandibular adenopathy.      Cervical: No cervical adenopathy.   Skin:     General: Skin is warm.      Findings: No rash.   Neurological:      General: No focal deficit present.      Mental Status: She is alert and oriented to person, place, and time. She is not disoriented.      Cranial Nerves: No cranial nerve deficit.   Psychiatric:         Attention and Perception: Attention and perception normal.         Mood and Affect: Mood and affect normal.         Speech: Speech normal.         Behavior: Behavior normal.          Thought Content: Thought content normal.         Cognition and Memory: Cognition and memory normal.         Judgment: Judgment normal.         Assessment:      1. Heart failure, diastolic, chronic    2. History of chest pain    3. Longstanding persistent atrial fibrillation    4. Chronic anticoagulation    5. Abdominal aortic aneurysm (AAA) without rupture    6. Primary hypertension    7. Hypercholesterolemia    8. Severe obesity    9. Gastroesophageal reflux disease without esophagitis    10. History of breast cancer        Plan:     1. Heart Failure, Diastolic, Chronic   2/21/2011: Echo: Moderate left ventricular hypertrophy. Moderate diastolic dysfunction. Mild to moderate mitral regurgitation.   On metoprolol 25 mg Q12, lisinopril 10 mg Q24, spironolactone 25 mg Q24 and hctz 12.5 mg Q24.   Well compensated.    2. History of Chest Pain   11/15/2013: ECG: NSR. Left axis deviation.   11/25/2013: Stress Echo: 6:30 min. Negative.   5/19/2015: Seen at [facility] ER for atypical chest pain.   6/2/2015: Stress Echo: 6:00 min. No CP. ECG negative. Echo negative. Mildly sigmoid septum.   3/15/2016: Recurrent atypical chest pain.   Appeared chest pain was non cardiac in origin.   Resolved.   Reassurance.    3. Atrial Fibrillation   9/9/2019: Diagnosed with paroxysmal atrial fibrillation.   [id number] ([id number]).   9/10/2019: CV.   8/5/2022: In atrial fibrillation. Appears atrial fibrillation now chronic.   Rate control strategy.   On apixiban.   On metoprolol 25 mg Q12.   Rate appears well controlled.     4. Chronic Anticoagulation   9/9/2019: Diagnosed with paroxysmal atrial fibrillation.   [id number] ([id number]).   On apixiban 5 mg Q12.   No aspirin or NSAID.   No bleeding.    5. Abdominal Aortic Aneurysm   1/24/2018: CT abdomen: Small AAA 3.1 cm.   1/2/2019: U/S AAA: Small AAA 2.5 cm x 3.0 cm.   6/22/2020: U/S AAA: Small AAA: 3.0 cm x 3.1 cm.   7/6/2021: U/S AAA: Small AAA: 2.8 x 2.9 cm. LCIA: Severe stenosis: 3.5  m/s.   7/6/2022: U/S AAA: Small AAA: 2.6 x 3.2 cm. RCIA: Severe stenosis - 2.7 m/s. LCIA: Severe stenosis - 3.5 m/s.   She has no claudication.   7/2023: Plan next U/S AAA. Then yearly.     6. Hypertension   2000: Diagnosed.   On metoprolol 25 mg Q12, lisinopril 10 mg Q24, spironolactone 25 mg Q24 and hctz 12.5 mg Q24.   Keeping log at home.    Well controlled.    7. Hypercholesterolemia   2005: Began statin.   On atorvastatin 80 mg Q24 and ezetimide 10 mg Q24.   Tells me well controlled.     8. Severe Obesity   4/10/2017: Weight 98 kg. BMI 32.   10/23/2020: Weight 106 kg. BMI 36.   2/26/2021: Weight 107 kg. BMI 36.   3/21/2022: Weight 109 kg. BMI 37.   Encouraged to lose weight.    9. Gastroesophageal Reflux Disease    4/2014: Diagnosed.   On omeprazole 20 mg Q24.   Seldom bothers her.    10. History of Breast Cancer   11/2019: Diagnosed. Right breast.   12/5/2019: Right mastectomy.   Dr. Kennedy John, III..   Dr. Erich Albrecht.    11. Bronchitis   3/2022: Has had UTI.   On inhalers.   Dr. Dru Nam.     12. Primary Care   Dr. Magdiel Deutsch.    F/u 4 months.    Mira Mcknight M.D.

## 2023-02-16 ENCOUNTER — OFFICE VISIT (OUTPATIENT)
Dept: CARDIOLOGY | Facility: CLINIC | Age: 83
End: 2023-02-16
Attending: INTERNAL MEDICINE
Payer: MEDICARE

## 2023-02-16 VITALS
BODY MASS INDEX: 34 KG/M2 | HEART RATE: 95 BPM | WEIGHT: 224.31 LBS | OXYGEN SATURATION: 96 % | DIASTOLIC BLOOD PRESSURE: 84 MMHG | HEIGHT: 68 IN | SYSTOLIC BLOOD PRESSURE: 116 MMHG

## 2023-02-16 DIAGNOSIS — E66.9 MILD OBESITY: ICD-10-CM

## 2023-02-16 DIAGNOSIS — Z85.3 HISTORY OF BREAST CANCER: ICD-10-CM

## 2023-02-16 DIAGNOSIS — Z87.898 HISTORY OF CHEST PAIN: ICD-10-CM

## 2023-02-16 DIAGNOSIS — I50.32 HEART FAILURE, DIASTOLIC, CHRONIC: ICD-10-CM

## 2023-02-16 DIAGNOSIS — E78.00 HYPERCHOLESTEROLEMIA: ICD-10-CM

## 2023-02-16 DIAGNOSIS — I48.0 PAROXYSMAL ATRIAL FIBRILLATION: ICD-10-CM

## 2023-02-16 DIAGNOSIS — I48.21 PERMANENT ATRIAL FIBRILLATION: ICD-10-CM

## 2023-02-16 DIAGNOSIS — K21.9 GASTROESOPHAGEAL REFLUX DISEASE WITHOUT ESOPHAGITIS: ICD-10-CM

## 2023-02-16 DIAGNOSIS — Z79.01 CHRONIC ANTICOAGULATION: ICD-10-CM

## 2023-02-16 DIAGNOSIS — I10 PRIMARY HYPERTENSION: ICD-10-CM

## 2023-02-16 DIAGNOSIS — I71.43 INFRARENAL ABDOMINAL AORTIC ANEURYSM (AAA) WITHOUT RUPTURE: ICD-10-CM

## 2023-02-16 PROCEDURE — 3288F FALL RISK ASSESSMENT DOCD: CPT | Mod: CPTII,S$GLB,, | Performed by: INTERNAL MEDICINE

## 2023-02-16 PROCEDURE — 1159F MED LIST DOCD IN RCRD: CPT | Mod: CPTII,S$GLB,, | Performed by: INTERNAL MEDICINE

## 2023-02-16 PROCEDURE — 1160F PR REVIEW ALL MEDS BY PRESCRIBER/CLIN PHARMACIST DOCUMENTED: ICD-10-PCS | Mod: CPTII,S$GLB,, | Performed by: INTERNAL MEDICINE

## 2023-02-16 PROCEDURE — 1159F PR MEDICATION LIST DOCUMENTED IN MEDICAL RECORD: ICD-10-PCS | Mod: CPTII,S$GLB,, | Performed by: INTERNAL MEDICINE

## 2023-02-16 PROCEDURE — 1101F PR PT FALLS ASSESS DOC 0-1 FALLS W/OUT INJ PAST YR: ICD-10-PCS | Mod: CPTII,S$GLB,, | Performed by: INTERNAL MEDICINE

## 2023-02-16 PROCEDURE — 99999 PR PBB SHADOW E&M-EST. PATIENT-LVL III: CPT | Mod: PBBFAC,,, | Performed by: INTERNAL MEDICINE

## 2023-02-16 PROCEDURE — 99999 PR PBB SHADOW E&M-EST. PATIENT-LVL III: ICD-10-PCS | Mod: PBBFAC,,, | Performed by: INTERNAL MEDICINE

## 2023-02-16 PROCEDURE — 3074F SYST BP LT 130 MM HG: CPT | Mod: CPTII,S$GLB,, | Performed by: INTERNAL MEDICINE

## 2023-02-16 PROCEDURE — 1101F PT FALLS ASSESS-DOCD LE1/YR: CPT | Mod: CPTII,S$GLB,, | Performed by: INTERNAL MEDICINE

## 2023-02-16 PROCEDURE — 99214 PR OFFICE/OUTPT VISIT, EST, LEVL IV, 30-39 MIN: ICD-10-PCS | Mod: S$GLB,,, | Performed by: INTERNAL MEDICINE

## 2023-02-16 PROCEDURE — 99214 OFFICE O/P EST MOD 30 MIN: CPT | Mod: S$GLB,,, | Performed by: INTERNAL MEDICINE

## 2023-02-16 PROCEDURE — 3288F PR FALLS RISK ASSESSMENT DOCUMENTED: ICD-10-PCS | Mod: CPTII,S$GLB,, | Performed by: INTERNAL MEDICINE

## 2023-02-16 PROCEDURE — 1126F AMNT PAIN NOTED NONE PRSNT: CPT | Mod: CPTII,S$GLB,, | Performed by: INTERNAL MEDICINE

## 2023-02-16 PROCEDURE — 3079F PR MOST RECENT DIASTOLIC BLOOD PRESSURE 80-89 MM HG: ICD-10-PCS | Mod: CPTII,S$GLB,, | Performed by: INTERNAL MEDICINE

## 2023-02-16 PROCEDURE — 3074F PR MOST RECENT SYSTOLIC BLOOD PRESSURE < 130 MM HG: ICD-10-PCS | Mod: CPTII,S$GLB,, | Performed by: INTERNAL MEDICINE

## 2023-02-16 PROCEDURE — 1160F RVW MEDS BY RX/DR IN RCRD: CPT | Mod: CPTII,S$GLB,, | Performed by: INTERNAL MEDICINE

## 2023-02-16 PROCEDURE — 1126F PR PAIN SEVERITY QUANTIFIED, NO PAIN PRESENT: ICD-10-PCS | Mod: CPTII,S$GLB,, | Performed by: INTERNAL MEDICINE

## 2023-02-16 PROCEDURE — 3079F DIAST BP 80-89 MM HG: CPT | Mod: CPTII,S$GLB,, | Performed by: INTERNAL MEDICINE

## 2023-02-16 RX ORDER — HYDROCHLOROTHIAZIDE 12.5 MG/1
12.5 CAPSULE ORAL DAILY
Qty: 90 CAPSULE | Refills: 3 | Status: SHIPPED | OUTPATIENT
Start: 2023-02-16 | End: 2023-06-30 | Stop reason: SDUPTHER

## 2023-02-16 RX ORDER — SPIRONOLACTONE 25 MG/1
25 TABLET ORAL DAILY
Qty: 90 TABLET | Refills: 3 | Status: SHIPPED | OUTPATIENT
Start: 2023-02-16 | End: 2023-06-30 | Stop reason: SDUPTHER

## 2023-02-16 RX ORDER — EZETIMIBE 10 MG/1
10 TABLET ORAL DAILY
Qty: 90 TABLET | Refills: 3 | Status: SHIPPED | OUTPATIENT
Start: 2023-02-16 | End: 2023-06-30 | Stop reason: SDUPTHER

## 2023-02-16 RX ORDER — ATORVASTATIN CALCIUM 80 MG/1
80 TABLET, FILM COATED ORAL DAILY
Qty: 90 TABLET | Refills: 3 | Status: SHIPPED | OUTPATIENT
Start: 2023-02-16 | End: 2023-06-30 | Stop reason: SDUPTHER

## 2023-02-16 RX ORDER — METOPROLOL SUCCINATE 25 MG/1
25 TABLET, EXTENDED RELEASE ORAL 2 TIMES DAILY
Qty: 180 TABLET | Refills: 3 | Status: SHIPPED | OUTPATIENT
Start: 2023-02-16 | End: 2023-06-30 | Stop reason: SDUPTHER

## 2023-02-16 RX ORDER — LISINOPRIL 10 MG/1
10 TABLET ORAL DAILY
Qty: 90 TABLET | Refills: 3 | Status: SHIPPED | OUTPATIENT
Start: 2023-02-16 | End: 2023-06-30

## 2023-02-16 NOTE — PROGRESS NOTES
Subjective:     Shanice Grewal is a 82 y.o. female with hypertension and hypercholesterolemia. In 2019 she was told she may have prediabetes. She is mildly obese. She has hypertensive heart disease with moderate diastolic dysfunction. She had an episode of chest pain when shopping on 11/14/2013. An ambulance was called for and she was checked at her home. She was advised to follow up with her doctor as an outpatient. She underwent a stress echocardiogram in 11/2013 that was fine. She was diagnosed with esophageal reflux disease in 4/2014. After she began a proton pump inhibitor and sucralfate her epigastric pain resolved. In 5/2015 she was seen in the emergency room at [facility] for atypical chest pain. She underwent a stress echocardiogram in 6/2015 during which she was able to exercise for 6:00 minutes. There was no chest pain and the electrocardiogram was negative. The echocardiogram was negative as well except for a mildly sigmoid septum. The chest pain later resolved. In 5/2018 she had gynecological surgery. On 9/9/2019 she had onset of palpitations and was diagnosed with atrial fibrillation. On 9/10/2019 she underwent electrical cardioversion. She has a [id number] score of 6 ([id number]) and was begun on apixiban. In 11/2019 she was diagnosed with breast cancer of the right breast. She underwent right mastectomy on 12/5/2019. In 3/2022 she was bothered by an upper respiratory in infection and has had occasional wheezing. She used inhalers. On 8/5/2022 she was again noted to be in atrial fibrillation and it then appeared that the atrial fibrillation had become chronic. No exertional chest pain but mild exertional dyspnea. No palpitations or weak spells. She continues to occasionally experience a warm feeling in the right calf that can come on at essentially any time and does not appear related to activities. No bleeding. No issues with any of her prescribed medications. Feeling well  overall.      Congestive Heart Failure  Presents for follow-up visit. The disease course has been stable. The condition has lasted for  years. Pertinent negatives include no abdominal pain, chest pain, chest pressure, claudication, edema, fatigue, muscle weakness, near-syncope, nocturia, orthopnea, palpitations, paroxysmal nocturnal dyspnea, shortness of breath or unexpected weight change. The symptoms have been stable.   Chest Pain   This is a chronic problem. The current episode started more than 1 year ago. The problem has been resolved. Pertinent negatives include no abdominal pain, back pain, claudication, cough, diaphoresis, dizziness, exertional chest pressure, fever, headaches, hemoptysis, irregular heartbeat, leg pain, lower extremity edema, malaise/fatigue, nausea, near-syncope, numbness, orthopnea, palpitations, PND, shortness of breath, sputum production, syncope, vomiting or weakness. The pain is aggravated by nothing.   Her past medical history is significant for CHF, hyperlipidemia and hypertension.   Pertinent negatives for past medical history include no diabetes and no muscle weakness.   Atrial Fibrillation  Presents for follow-up visit. Symptoms include hypertension. Symptoms are negative for bradycardia, chest pain, dizziness, hypotension, palpitations, shortness of breath, syncope, tachycardia and weakness. The symptoms have been stable. Past medical history includes CHF and hyperlipidemia.   Hypertension  This is a chronic problem. The current episode started more than 1 year ago. The problem is unchanged. The problem is controlled (usually 120-130/70-80 mmHg at home). Pertinent negatives include no anxiety, blurred vision, chest pain, headaches, malaise/fatigue, neck pain, orthopnea, palpitations, peripheral edema, PND, shortness of breath or sweats. There is no history of chronic renal disease.   Hyperlipidemia  This is a chronic problem. The current episode started more than 1 year ago. The  problem is controlled. Recent lipid tests were reviewed and are normal. Exacerbating diseases include obesity. She has no history of chronic renal disease, diabetes, hypothyroidism, liver disease or nephrotic syndrome. Pertinent negatives include no chest pain, focal sensory loss, focal weakness, leg pain, myalgias or shortness of breath.     Review of Systems   Constitutional: Negative for diaphoresis, fatigue, fever, malaise/fatigue and unexpected weight change.   HENT:  Negative for nosebleeds.    Eyes:  Negative for blurred vision, pain, vision loss in left eye and vision loss in right eye.   Cardiovascular:  Positive for dyspnea on exertion. Negative for chest pain, claudication, irregular heartbeat, leg swelling, near-syncope, orthopnea, palpitations, paroxysmal nocturnal dyspnea and syncope.   Respiratory:  Negative for cough, hemoptysis, shortness of breath, sputum production and wheezing.    Endocrine: Negative for cold intolerance, heat intolerance and polydipsia.   Hematologic/Lymphatic: Negative for bleeding problem. Does not bruise/bleed easily.   Skin:  Negative for color change and rash.   Musculoskeletal:  Positive for arthritis. Negative for back pain, falls, joint pain, muscle weakness, myalgias and neck pain.   Gastrointestinal:  Negative for abdominal pain, heartburn, hematemesis, hematochezia, hemorrhoids, jaundice, melena, nausea and vomiting.   Genitourinary:  Negative for dysuria, hematuria and nocturia.   Neurological:  Negative for dizziness, focal weakness, headaches, light-headedness, loss of balance, numbness, tremors, vertigo and weakness.   Psychiatric/Behavioral:  Negative for altered mental status, depression and memory loss. The patient is not nervous/anxious.    Allergic/Immunologic: Negative for hives and persistent infections.       Current Outpatient Medications on File Prior to Visit   Medication Sig Dispense Refill    apixaban (ELIQUIS) 5 mg Tab Take 1 tablet (5 mg total) by  "mouth 2 (two) times daily. 180 tablet 3    atorvastatin (LIPITOR) 80 MG tablet Take 1 tablet (80 mg total) by mouth once daily. 90 tablet 3    azelastine (ASTELIN) 137 mcg (0.1 %) nasal spray 1 spray by Nasal route 2 (two) times daily.      ergocalciferol (ERGOCALCIFEROL) 50,000 unit Cap Take 50,000 Units by mouth every 14 (fourteen) days.      ezetimibe (ZETIA) 10 mg tablet TAKE 1 TABLET (10 MG TOTAL) BY MOUTH ONCE DAILY. 90 tablet 3    fluticasone (FLONASE) 50 mcg/actuation nasal spray       FLUVIRIN [id number] 45 mcg (15 mcg x 3)/0.5 mL Susp       gabapentin (NEURONTIN) 100 MG capsule Take 100 mg by mouth 3 (three) times daily.      hydroCHLOROthiazide (MICROZIDE) 12.5 mg capsule TAKE 1 CAPSULE(12.5 MG) BY MOUTH EVERY DAY 90 capsule 3    lisinopriL 10 MG tablet Take 1 tablet (10 mg total) by mouth once daily. 90 tablet 3    LORazepam (ATIVAN) 1 MG tablet Take 1 mg by mouth every 12 (twelve) hours as needed for Anxiety.      metoclopramide HCl (REGLAN) 10 MG tablet       metoprolol tartrate (LOPRESSOR) 25 MG tablet Take 1 tablet (25 mg total) by mouth 2 (two) times daily. 180 tablet 3    omeprazole (PRILOSEC) 20 MG capsule Take 20 mg by mouth once daily.      spironolactone (ALDACTONE) 25 MG tablet Take 1 tablet (25 mg total) by mouth once daily. 90 tablet 3    omega-3 fatty acids/fish oil (FISH OIL-OMEGA-3 FATTY ACIDS) 300-1,000 mg capsule Take 1 capsule by mouth once daily. (Patient not taking: Reported on 8/5/2022) 90 capsule 3    PATANOL 0.1 % ophthalmic solution       sucralfate (CARAFATE) 1 gram tablet Take 1 g by mouth 4 (four) times daily before meals and nightly.        No current facility-administered medications on file prior to visit.       /84 (BP Location: Right arm, Patient Position: Sitting, BP Method: Medium (Manual))   Pulse 95   Ht 5' 8" (1.727 m)   Wt 101.8 kg (224 lb 5.1 oz)   SpO2 96%   BMI 34.11 kg/m²       Objective:     Physical Exam  Constitutional:       General: She is not in " acute distress.     Appearance: Normal appearance. She is well-developed. She is not ill-appearing or diaphoretic.   HENT:      Head: Normocephalic and atraumatic.      Nose: Nose normal.   Eyes:      General:         Right eye: No discharge.         Left eye: No discharge.      Conjunctiva/sclera:      Right eye: No hemorrhage.     Left eye: No hemorrhage.     Pupils: Pupils are equal.      Right eye: Pupil is round.      Left eye: Pupil is round.   Neck:      Thyroid: No thyromegaly.      Vascular: No carotid bruit or JVD.   Cardiovascular:      Rate and Rhythm: Normal rate. Rhythm irregularly irregular. No extrasystoles are present.     Chest Wall: PMI is not displaced.      Pulses:           Radial pulses are 2+ on the right side and 2+ on the left side.        Dorsalis pedis pulses are 1+ on the right side and 1+ on the left side.        Posterior tibial pulses are 1+ on the right side and 1+ on the left side.      Heart sounds: S1 normal and S2 normal. Murmur heard.   Harsh midsystolic murmur is present with a grade of 2/6 at the upper right sternal border.   Pulmonary:      Effort: Pulmonary effort is normal.      Breath sounds: Normal breath sounds.   Abdominal:      Palpations: Abdomen is soft.      Tenderness: There is no abdominal tenderness.   Musculoskeletal:      Cervical back: Neck supple.      Right ankle: No swelling, deformity or ecchymosis.      Left ankle: No swelling, deformity or ecchymosis.   Lymphadenopathy:      Head:      Right side of head: No submandibular adenopathy.      Left side of head: No submandibular adenopathy.      Cervical: No cervical adenopathy.   Skin:     General: Skin is warm.      Findings: No rash.   Neurological:      General: No focal deficit present.      Mental Status: She is alert and oriented to person, place, and time. She is not disoriented.      Cranial Nerves: No cranial nerve deficit.   Psychiatric:         Attention and Perception: Attention and perception  normal.         Mood and Affect: Mood and affect normal.         Speech: Speech normal.         Behavior: Behavior normal.         Thought Content: Thought content normal.         Cognition and Memory: Cognition and memory normal.         Judgment: Judgment normal.       Assessment:      1. Heart failure, diastolic, chronic    2. History of chest pain    3. Permanent atrial fibrillation    4. Chronic anticoagulation    5. Infrarenal abdominal aortic aneurysm (AAA) without rupture    6. Primary hypertension    7. Hypercholesterolemia    8. Mild obesity    9. Gastroesophageal reflux disease without esophagitis    10. History of breast cancer        Plan:     1. Heart Failure, Diastolic, Chronic   2/21/2011: Echo: Moderate left ventricular hypertrophy. Moderate diastolic dysfunction. Mild to moderate mitral regurgitation.   On metoprolol 25 mg Q12, lisinopril 10 mg Q24, spironolactone 25 mg Q24 and hctz 12.5 mg Q24.   Well compensated.    2. History of Chest Pain   11/15/2013: ECG: NSR. Left axis deviation.   11/25/2013: Stress Echo: 6:30 min. Negative.   5/19/2015: Seen at [facility] ER for atypical chest pain.   6/2/2015: Stress Echo: 6:00 min. No CP. ECG negative. Echo negative. Mildly sigmoid septum.   3/15/2016: Recurrent atypical chest pain.   Appeared chest pain was non cardiac in origin.   Resolved.   Reassurance.    3. Atrial Fibrillation   9/9/2019: Diagnosed with paroxysmal atrial fibrillation.   [id number] ([id number]).   9/10/2019: CV.   8/5/2022: In atrial fibrillation - appeared atrial fibrillation had become chronic.   Rate control strategy.   On apixiban.   On metoprolol 25 mg Q12.   Rate appears well controlled.     4. Chronic Anticoagulation   9/9/2019: Diagnosed with paroxysmal atrial fibrillation.   [id number] ([id number]).   On apixiban 5 mg Q12.   No aspirin or NSAID.   No bleeding.    5. Abdominal Aortic Aneurysm   1/24/2018: CT abdomen: Small AAA 3.1 cm.   1/2/2019: U/S AAA: Small AAA 2.5 cm x 3.0  cm.   6/22/2020: U/S AAA: Small AAA: 3.0 cm x 3.1 cm.   7/6/2021: U/S AAA: Small AAA: 2.8 x 2.9 cm. LCIA: Severe stenosis: 3.5 m/s.   7/6/2022: U/S AAA: Small AAA: 2.6 x 3.2 cm. RCIA: Severe stenosis - 2.7 m/s. LCIA: Severe stenosis - 3.5 m/s.   She has no claudication.   7/2023: Plan next U/S AAA. Then yearly.     6. Hypertension   2000: Diagnosed.   On metoprolol 25 mg Q12, lisinopril 10 mg Q24, spironolactone 25 mg Q24 and hctz 12.5 mg Q24.   Keeping log at home.    Well controlled.    7. Hypercholesterolemia   2005: Began statin.   On atorvastatin 80 mg Q24 and ezetimide 10 mg Q24.   Tells me well controlled.     8. Severe Obesity   4/10/2017: Weight 98 kg. BMI 32.   10/23/2020: Weight 106 kg. BMI 36.   2/26/2021: Weight 107 kg. BMI 36.   3/21/2022: Weight 109 kg. BMI 37.   Encouraged to lose weight.    9. Gastroesophageal Reflux Disease    4/2014: Diagnosed.   On omeprazole 20 mg Q24.   Seldom bothers her.    10. History of Breast Cancer   11/2019: Diagnosed. Right breast.   12/5/2019: Right mastectomy.   Dr. Kennedy John, III..   Dr. Erich Albrecht.    11. Bronchitis   3/2022: Has had UTI.   On inhalers.   Dr. Dru Nam.     12. Primary Care   Dr. Magdiel Deutsch.    F/u 4 months.    Mira Mcknight M.D.

## 2023-06-30 ENCOUNTER — OFFICE VISIT (OUTPATIENT)
Dept: CARDIOLOGY | Facility: CLINIC | Age: 83
End: 2023-06-30
Attending: INTERNAL MEDICINE
Payer: MEDICARE

## 2023-06-30 VITALS
DIASTOLIC BLOOD PRESSURE: 80 MMHG | HEART RATE: 90 BPM | SYSTOLIC BLOOD PRESSURE: 118 MMHG | BODY MASS INDEX: 33.65 KG/M2 | WEIGHT: 222 LBS | HEIGHT: 68 IN | OXYGEN SATURATION: 97 %

## 2023-06-30 DIAGNOSIS — E78.00 HYPERCHOLESTEROLEMIA: ICD-10-CM

## 2023-06-30 DIAGNOSIS — Z79.01 CHRONIC ANTICOAGULATION: ICD-10-CM

## 2023-06-30 DIAGNOSIS — Z85.3 HISTORY OF BREAST CANCER: ICD-10-CM

## 2023-06-30 DIAGNOSIS — Z87.898 HISTORY OF CHEST PAIN: ICD-10-CM

## 2023-06-30 DIAGNOSIS — I48.21 PERMANENT ATRIAL FIBRILLATION: ICD-10-CM

## 2023-06-30 DIAGNOSIS — I71.43 INFRARENAL ABDOMINAL AORTIC ANEURYSM (AAA) WITHOUT RUPTURE: ICD-10-CM

## 2023-06-30 DIAGNOSIS — K21.9 GASTROESOPHAGEAL REFLUX DISEASE WITHOUT ESOPHAGITIS: ICD-10-CM

## 2023-06-30 DIAGNOSIS — I50.32 HEART FAILURE, DIASTOLIC, CHRONIC: ICD-10-CM

## 2023-06-30 DIAGNOSIS — I10 PRIMARY HYPERTENSION: ICD-10-CM

## 2023-06-30 DIAGNOSIS — E66.9 MILD OBESITY: ICD-10-CM

## 2023-06-30 PROCEDURE — 1159F MED LIST DOCD IN RCRD: CPT | Mod: CPTII,S$GLB,, | Performed by: INTERNAL MEDICINE

## 2023-06-30 PROCEDURE — 99999 PR PBB SHADOW E&M-EST. PATIENT-LVL III: ICD-10-PCS | Mod: PBBFAC,,, | Performed by: INTERNAL MEDICINE

## 2023-06-30 PROCEDURE — 1160F RVW MEDS BY RX/DR IN RCRD: CPT | Mod: CPTII,S$GLB,, | Performed by: INTERNAL MEDICINE

## 2023-06-30 PROCEDURE — 99999 PR PBB SHADOW E&M-EST. PATIENT-LVL III: CPT | Mod: PBBFAC,,, | Performed by: INTERNAL MEDICINE

## 2023-06-30 PROCEDURE — 1126F PR PAIN SEVERITY QUANTIFIED, NO PAIN PRESENT: ICD-10-PCS | Mod: CPTII,S$GLB,, | Performed by: INTERNAL MEDICINE

## 2023-06-30 PROCEDURE — 1160F PR REVIEW ALL MEDS BY PRESCRIBER/CLIN PHARMACIST DOCUMENTED: ICD-10-PCS | Mod: CPTII,S$GLB,, | Performed by: INTERNAL MEDICINE

## 2023-06-30 PROCEDURE — 99214 PR OFFICE/OUTPT VISIT, EST, LEVL IV, 30-39 MIN: ICD-10-PCS | Mod: S$GLB,,, | Performed by: INTERNAL MEDICINE

## 2023-06-30 PROCEDURE — 99214 OFFICE O/P EST MOD 30 MIN: CPT | Mod: S$GLB,,, | Performed by: INTERNAL MEDICINE

## 2023-06-30 PROCEDURE — 3074F PR MOST RECENT SYSTOLIC BLOOD PRESSURE < 130 MM HG: ICD-10-PCS | Mod: CPTII,S$GLB,, | Performed by: INTERNAL MEDICINE

## 2023-06-30 PROCEDURE — 3079F PR MOST RECENT DIASTOLIC BLOOD PRESSURE 80-89 MM HG: ICD-10-PCS | Mod: CPTII,S$GLB,, | Performed by: INTERNAL MEDICINE

## 2023-06-30 PROCEDURE — 3074F SYST BP LT 130 MM HG: CPT | Mod: CPTII,S$GLB,, | Performed by: INTERNAL MEDICINE

## 2023-06-30 PROCEDURE — 1126F AMNT PAIN NOTED NONE PRSNT: CPT | Mod: CPTII,S$GLB,, | Performed by: INTERNAL MEDICINE

## 2023-06-30 PROCEDURE — 3079F DIAST BP 80-89 MM HG: CPT | Mod: CPTII,S$GLB,, | Performed by: INTERNAL MEDICINE

## 2023-06-30 PROCEDURE — 1159F PR MEDICATION LIST DOCUMENTED IN MEDICAL RECORD: ICD-10-PCS | Mod: CPTII,S$GLB,, | Performed by: INTERNAL MEDICINE

## 2023-06-30 RX ORDER — METOPROLOL SUCCINATE 25 MG/1
25 TABLET, EXTENDED RELEASE ORAL 2 TIMES DAILY
Qty: 180 TABLET | Refills: 3 | Status: SHIPPED | OUTPATIENT
Start: 2023-06-30 | End: 2023-11-03 | Stop reason: SDUPTHER

## 2023-06-30 RX ORDER — HYDROCHLOROTHIAZIDE 12.5 MG/1
12.5 CAPSULE ORAL DAILY
Qty: 90 CAPSULE | Refills: 3 | Status: SHIPPED | OUTPATIENT
Start: 2023-06-30 | End: 2023-11-03 | Stop reason: SDUPTHER

## 2023-06-30 RX ORDER — SPIRONOLACTONE 25 MG/1
25 TABLET ORAL DAILY
Qty: 90 TABLET | Refills: 3 | Status: SHIPPED | OUTPATIENT
Start: 2023-06-30 | End: 2023-11-03 | Stop reason: SDUPTHER

## 2023-06-30 RX ORDER — VALSARTAN 160 MG/1
160 TABLET ORAL DAILY
Qty: 90 TABLET | Refills: 3 | Status: SHIPPED | OUTPATIENT
Start: 2023-06-30 | End: 2023-11-03 | Stop reason: SDUPTHER

## 2023-06-30 RX ORDER — ATORVASTATIN CALCIUM 80 MG/1
80 TABLET, FILM COATED ORAL DAILY
Qty: 90 TABLET | Refills: 3 | Status: SHIPPED | OUTPATIENT
Start: 2023-06-30 | End: 2023-11-03 | Stop reason: SDUPTHER

## 2023-06-30 RX ORDER — EZETIMIBE 10 MG/1
10 TABLET ORAL DAILY
Qty: 90 TABLET | Refills: 3 | Status: SHIPPED | OUTPATIENT
Start: 2023-06-30 | End: 2023-11-03 | Stop reason: SDUPTHER

## 2023-06-30 NOTE — PROGRESS NOTES
Subjective:     Shanice Grewal is a 83 y.o. female with hypertension and hypercholesterolemia. In 2019 she was told she may have prediabetes. She is mildly obese. She has hypertensive heart disease with moderate diastolic dysfunction. She had an episode of chest pain when shopping on 11/14/2013. An ambulance was called for and she was checked at her home. She was advised to follow up with her doctor as an outpatient. She underwent a stress echocardiogram in 11/2013 that was fine. She was diagnosed with esophageal reflux disease in 4/2014. After she began a proton pump inhibitor and sucralfate her epigastric pain resolved. In 5/2015 she was seen in the emergency room at [facility] for atypical chest pain. She underwent a stress echocardiogram in 6/2015 during which she was able to exercise for 6:00 minutes. There was no chest pain and the electrocardiogram was negative. The echocardiogram was negative as well except for a mildly sigmoid septum. The chest pain later resolved. In 5/2018 she had gynecological surgery. On 9/9/2019 she had onset of palpitations and was diagnosed with atrial fibrillation. On 9/10/2019 she underwent electrical cardioversion. She has a [id number] score of 6 ([id number]) and was begun on apixiban. In 11/2019 she was diagnosed with breast cancer of the right breast. She underwent right mastectomy on 12/5/2019. In 3/2022 she was bothered by an upper respiratory in infection and has had occasional wheezing. She used inhalers. On 8/5/2022 she was again noted to be in atrial fibrillation and it then appeared that the atrial fibrillation had become chronic. No exertional chest pain but mild exertional dyspnea. No palpitations or weak spells. She continues to occasionally experience a warm feeling in the right calf that can come on at essentially any time and does not appear related to activities. No bleeding. No issues with any of her prescribed medications. Feeling well  overall.      Congestive Heart Failure  Presents for follow-up visit. The disease course has been stable. The condition has lasted for  years. Pertinent negatives include no abdominal pain, chest pain, chest pressure, claudication, edema, fatigue, muscle weakness, near-syncope, nocturia, orthopnea, palpitations, paroxysmal nocturnal dyspnea, shortness of breath or unexpected weight change. The symptoms have been stable.   Chest Pain   This is a chronic problem. The current episode started more than 1 year ago. The problem has been resolved. Pertinent negatives include no abdominal pain, back pain, claudication, cough, diaphoresis, dizziness, exertional chest pressure, fever, headaches, hemoptysis, irregular heartbeat, leg pain, lower extremity edema, malaise/fatigue, nausea, near-syncope, numbness, orthopnea, palpitations, PND, shortness of breath, sputum production, syncope, vomiting or weakness. The pain is aggravated by nothing.   Her past medical history is significant for CHF, hyperlipidemia and hypertension.   Pertinent negatives for past medical history include no diabetes and no muscle weakness.   Atrial Fibrillation  Presents for follow-up visit. Symptoms include hypertension. Symptoms are negative for bradycardia, chest pain, dizziness, hypotension, palpitations, shortness of breath, syncope, tachycardia and weakness. The symptoms have been stable. Past medical history includes CHF and hyperlipidemia.   Hypertension  This is a chronic problem. The current episode started more than 1 year ago. The problem is unchanged. The problem is controlled (usually 120-130/70-80 mmHg at home). Pertinent negatives include no anxiety, blurred vision, chest pain, headaches, malaise/fatigue, neck pain, orthopnea, palpitations, peripheral edema, PND, shortness of breath or sweats. There is no history of chronic renal disease.   Hyperlipidemia  This is a chronic problem. The current episode started more than 1 year ago. The  problem is controlled. Recent lipid tests were reviewed and are normal. Exacerbating diseases include obesity. She has no history of chronic renal disease, diabetes, hypothyroidism, liver disease or nephrotic syndrome. Pertinent negatives include no chest pain, focal sensory loss, focal weakness, leg pain, myalgias or shortness of breath.     Review of Systems   Constitutional: Negative for diaphoresis, fatigue, fever, malaise/fatigue and unexpected weight change.   HENT:  Negative for nosebleeds.    Eyes:  Negative for blurred vision, pain, vision loss in left eye and vision loss in right eye.   Cardiovascular:  Positive for dyspnea on exertion. Negative for chest pain, claudication, irregular heartbeat, leg swelling, near-syncope, orthopnea, palpitations, paroxysmal nocturnal dyspnea and syncope.   Respiratory:  Negative for cough, hemoptysis, shortness of breath, sputum production and wheezing.    Endocrine: Negative for cold intolerance, heat intolerance and polydipsia.   Hematologic/Lymphatic: Negative for bleeding problem. Does not bruise/bleed easily.   Skin:  Negative for color change and rash.   Musculoskeletal:  Positive for arthritis. Negative for back pain, falls, joint pain, muscle weakness, myalgias and neck pain.   Gastrointestinal:  Negative for abdominal pain, heartburn, hematemesis, hematochezia, hemorrhoids, jaundice, melena, nausea and vomiting.   Genitourinary:  Negative for dysuria, hematuria and nocturia.   Neurological:  Negative for dizziness, focal weakness, headaches, light-headedness, loss of balance, numbness, tremors, vertigo and weakness.   Psychiatric/Behavioral:  Negative for altered mental status, depression and memory loss. The patient is not nervous/anxious.    Allergic/Immunologic: Negative for hives and persistent infections.       Current Outpatient Medications on File Prior to Visit   Medication Sig Dispense Refill    apixaban (ELIQUIS) 5 mg Tab Take 1 tablet (5 mg total) by  "mouth 2 (two) times daily. 180 tablet 3    atorvastatin (LIPITOR) 80 MG tablet Take 1 tablet (80 mg total) by mouth once daily. 90 tablet 3    ergocalciferol (ERGOCALCIFEROL) 50,000 unit Cap Take 50,000 Units by mouth every 14 (fourteen) days.      ezetimibe (ZETIA) 10 mg tablet Take 1 tablet (10 mg total) by mouth once daily. 90 tablet 3    gabapentin (NEURONTIN) 100 MG capsule Take 100 mg by mouth 3 (three) times daily.      hydroCHLOROthiazide (MICROZIDE) 12.5 mg capsule Take 1 capsule (12.5 mg total) by mouth once daily. 90 capsule 3    LORazepam (ATIVAN) 1 MG tablet Take 1 mg by mouth every 12 (twelve) hours as needed for Anxiety.      metoclopramide HCl (REGLAN) 10 MG tablet       metoprolol succinate (TOPROL-XL) 25 MG 24 hr tablet Take 1 tablet (25 mg total) by mouth 2 (two) times daily. 180 tablet 3    omeprazole (PRILOSEC) 20 MG capsule Take 20 mg by mouth once daily.      spironolactone (ALDACTONE) 25 MG tablet Take 1 tablet (25 mg total) by mouth once daily. 90 tablet 3    azelastine (ASTELIN) 137 mcg (0.1 %) nasal spray 1 spray by Nasal route 2 (two) times daily.      fluticasone (FLONASE) 50 mcg/actuation nasal spray       FLUVIRIN [id number] 45 mcg (15 mcg x 3)/0.5 mL Susp       lisinopriL 10 MG tablet Take 1 tablet (10 mg total) by mouth once daily. (Patient not taking: Reported on 6/30/2023) 90 tablet 3    omega-3 fatty acids/fish oil (FISH OIL-OMEGA-3 FATTY ACIDS) 300-1,000 mg capsule Take 1 capsule by mouth once daily. (Patient not taking: Reported on 8/5/2022) 90 capsule 3    PATANOL 0.1 % ophthalmic solution       sucralfate (CARAFATE) 1 gram tablet Take 1 g by mouth 4 (four) times daily before meals and nightly.        No current facility-administered medications on file prior to visit.       /80 (BP Location: Right arm, Patient Position: Sitting, BP Method: Large (Manual))   Pulse 90   Ht 5' 8" (1.727 m)   Wt 100.7 kg (222 lb)   SpO2 97%   BMI 33.75 kg/m²       Objective:     Physical " Exam  Constitutional:       General: She is not in acute distress.     Appearance: Normal appearance. She is well-developed. She is not ill-appearing or diaphoretic.   HENT:      Head: Normocephalic and atraumatic.      Nose: Nose normal.   Eyes:      General:         Right eye: No discharge.         Left eye: No discharge.      Conjunctiva/sclera:      Right eye: No hemorrhage.     Left eye: No hemorrhage.     Pupils: Pupils are equal.      Right eye: Pupil is round.      Left eye: Pupil is round.   Neck:      Thyroid: No thyromegaly.      Vascular: No carotid bruit or JVD.   Cardiovascular:      Rate and Rhythm: Normal rate. Rhythm irregularly irregular. No extrasystoles are present.     Chest Wall: PMI is not displaced.      Pulses:           Radial pulses are 2+ on the right side and 2+ on the left side.        Dorsalis pedis pulses are 1+ on the right side and 1+ on the left side.        Posterior tibial pulses are 1+ on the right side and 1+ on the left side.      Heart sounds: S1 normal and S2 normal. Murmur heard.   Harsh midsystolic murmur is present with a grade of 2/6 at the upper right sternal border.   Pulmonary:      Effort: Pulmonary effort is normal.      Breath sounds: Normal breath sounds.   Abdominal:      Palpations: Abdomen is soft.      Tenderness: There is no abdominal tenderness.   Musculoskeletal:      Cervical back: Neck supple.      Right ankle: No swelling, deformity or ecchymosis.      Left ankle: No swelling, deformity or ecchymosis.   Lymphadenopathy:      Head:      Right side of head: No submandibular adenopathy.      Left side of head: No submandibular adenopathy.      Cervical: No cervical adenopathy.   Skin:     General: Skin is warm.      Findings: No rash.   Neurological:      General: No focal deficit present.      Mental Status: She is alert and oriented to person, place, and time. She is not disoriented.      Cranial Nerves: No cranial nerve deficit.   Psychiatric:          Attention and Perception: Attention and perception normal.         Mood and Affect: Mood and affect normal.         Speech: Speech normal.         Behavior: Behavior normal.         Thought Content: Thought content normal.         Cognition and Memory: Cognition and memory normal.         Judgment: Judgment normal.       Assessment:      1. Heart failure, diastolic, chronic    2. History of chest pain    3. Permanent atrial fibrillation    4. Chronic anticoagulation    5. Infrarenal abdominal aortic aneurysm (AAA) without rupture    6. Primary hypertension    7. Hypercholesterolemia    8. Mild obesity    9. Gastroesophageal reflux disease without esophagitis    10. History of breast cancer        Plan:     1. Heart Failure, Diastolic, Chronic   2/21/2011: Echo: Moderate left ventricular hypertrophy. Moderate diastolic dysfunction. Mild to moderate mitral regurgitation.   4/19/2023: Lisinopril 10 mg Q24 was changed to valsartan 160 mg Q24 due to cough.   On metoprolol 25 mg Q12, valsartan 160 mg Q24, spironolactone 25 mg Q24 and hctz 12.5 mg Q24.   Well compensated.    2. History of Chest Pain   11/15/2013: ECG: NSR. Left axis deviation.   11/25/2013: Stress Echo: 6:30 min. Negative.   5/19/2015: Seen at [facility] for atypical chest pain.   6/2/2015: Stress Echo: 6:00 min. No CP. ECG negative. Echo negative. Mildly sigmoid septum.   3/15/2016: Recurrent atypical chest pain.   Appeared chest pain was non cardiac in origin.   Resolved.   Reassurance.    3. Atrial Fibrillation   9/9/2019: Diagnosed with paroxysmal atrial fibrillation.   [id number]=4 ([id number]).   9/10/2019: CV.   8/5/2022: In atrial fibrillation - appeared atrial fibrillation had become chronic.   Rate control strategy.   On apixiban.   On metoprolol 25 mg Q12.   Rate appears well controlled.     4. Chronic Anticoagulation   9/9/2019: Diagnosed with paroxysmal atrial fibrillation.   [id number]=2 ([id number]).   On apixiban 5 mg Q12.   No aspirin or  NSAID.   No bleeding.    5. Abdominal Aortic Aneurysm   1/24/2018: CT abdomen: Small AAA 3.1 cm.   1/2/2019: U/S AAA: Small AAA 2.5 cm x 3.0 cm.   6/22/2020: U/S AAA: Small AAA: 3.0 cm x 3.1 cm.   7/6/2021: U/S AAA: Small AAA: 2.8 x 2.9 cm. LCIA: Severe stenosis: 3.5 m/s.   7/6/2022: U/S AAA: Small AAA: 2.6 x 3.2 cm. RCIA: Severe stenosis - 2.7 m/s. LCIA: Severe stenosis - 3.5 m/s.   She has no claudication.   7/2023: Plan next U/S AAA. Then yearly.     6. Hypertension   2000: Diagnosed.   4/19/2023: Lisinopril 10 mg Q24 was changed to valsartan 160 mg Q24 due to cough.   On metoprolol 25 mg Q12, valsartan 160 mg Q24, spironolactone 25 mg Q24 and hctz 12.5 mg Q24.   Keeping log at home.    Well controlled.    7. Hypercholesterolemia   2005: Began statin.   On atorvastatin 80 mg Q24 and ezetimide 10 mg Q24.   Tells me well controlled.     8. Mild Obesity   4/10/2017: Weight 98 kg. BMI 32.   10/23/2020: Weight 106 kg. BMI 36.   2/26/2021: Weight 107 kg. BMI 36.   3/21/2022: Weight 109 kg. BMI 37.   6/30/2023: weight 101 kg. BMI 34.   Encouraged to lose weight.    9. Gastroesophageal Reflux Disease    4/2014: Diagnosed.   On omeprazole 20 mg Q24.   Seldom bothers her.    10. History of Breast Cancer   11/2019: Diagnosed. Right breast.   12/5/2019: Right mastectomy.   Dr. Kennedy John, III..   Dr. Erich Albrecht.    11. Bronchitis   3/2022: Has had UTI.   On inhalers.   Dr. Dru Nam.     12. Primary Care   Dr. Magdiel Deutsch.    F/u 4 months.    Mira Mcknight M.D.      8/10/2023 5:09 PM, Addendum:    8/10/2023: U/S AAA: Ectatic AA: 2.7 x 2.8 cm. RCIA: Severe stenosis - 2.7 m/s. LCIA: Severe stenosis - 3.4 m/s.    About the same as 2022.    I discussed the above test result and the implications of the findings over the phone.    Mira Mcknight M.D.

## 2023-08-10 ENCOUNTER — HOSPITAL ENCOUNTER (OUTPATIENT)
Dept: CARDIOLOGY | Facility: OTHER | Age: 83
Discharge: HOME | End: 2023-08-10
Attending: INTERNAL MEDICINE
Payer: MEDICARE

## 2023-08-10 DIAGNOSIS — I71.43 INFRARENAL ABDOMINAL AORTIC ANEURYSM (AAA) WITHOUT RUPTURE: ICD-10-CM

## 2023-08-10 LAB
ABDOMINAL INFRARENAL AORTA AP: 2.7 CM
ABDOMINAL INFRARENAL AORTA ED VEL: 11 CM/S
ABDOMINAL INFRARENAL AORTA PS VEL: 69 CM/S
ABDOMINAL INFRARENAL AORTA TRANS: 2.8 CM
ABDOMINAL JUXTARENAL AORTA AP: 2.5 CM
ABDOMINAL JUXTARENAL AORTA ED VEL: 17 CM/S
ABDOMINAL JUXTARENAL AORTA PS VEL: 111 CM/S
ABDOMINAL JUXTARENAL AORTA TRANS: 2.7 CM
ABDOMINAL LT COM ILIAC AP: 1.1 CM
ABDOMINAL LT COM ILIAC TRANS: 1.3 CM
ABDOMINAL LT COM ILIAC VEL: 345 CM/S
ABDOMINAL LT COM ILLIAC ED VEL: 21 CM/S
ABDOMINAL RT COM ILIAC AP: 1.5 CM
ABDOMINAL RT COM ILIAC TRANS: 1.5 CM
ABDOMINAL RT COM ILIAC VEL: 274 CM/S
ABDOMINAL RT COM ILLIAC ED VEL: 36 CM/S
ABDOMINAL SUPRARENAL AORTA AP: 2.6 CM
ABDOMINAL SUPRARENAL AORTA ED VEL: 13 CM/S
ABDOMINAL SUPRARENAL AORTA PS VEL: 62 CM/S
ABDOMINAL SUPRARENAL AORTA TRANS: 2.5 CM

## 2023-08-10 PROCEDURE — 76706 CV US AAA SCREENING (CUPID ONLY): ICD-10-PCS | Mod: 26,,, | Performed by: INTERNAL MEDICINE

## 2023-08-10 PROCEDURE — 76706 US ABDL AORTA SCREEN AAA: CPT | Mod: 26,,, | Performed by: INTERNAL MEDICINE

## 2023-08-10 PROCEDURE — 76706 US ABDL AORTA SCREEN AAA: CPT

## 2023-11-03 ENCOUNTER — OFFICE VISIT (OUTPATIENT)
Dept: CARDIOLOGY | Facility: CLINIC | Age: 83
End: 2023-11-03
Attending: INTERNAL MEDICINE
Payer: MEDICARE

## 2023-11-03 VITALS
DIASTOLIC BLOOD PRESSURE: 66 MMHG | HEART RATE: 81 BPM | WEIGHT: 223.19 LBS | HEIGHT: 68 IN | BODY MASS INDEX: 33.83 KG/M2 | SYSTOLIC BLOOD PRESSURE: 96 MMHG | OXYGEN SATURATION: 96 %

## 2023-11-03 DIAGNOSIS — E78.00 HYPERCHOLESTEROLEMIA: ICD-10-CM

## 2023-11-03 DIAGNOSIS — E66.9 MILD OBESITY: ICD-10-CM

## 2023-11-03 DIAGNOSIS — I10 PRIMARY HYPERTENSION: ICD-10-CM

## 2023-11-03 DIAGNOSIS — K21.9 GASTROESOPHAGEAL REFLUX DISEASE WITHOUT ESOPHAGITIS: ICD-10-CM

## 2023-11-03 DIAGNOSIS — Z87.898 HISTORY OF CHEST PAIN: ICD-10-CM

## 2023-11-03 DIAGNOSIS — I71.43 INFRARENAL ABDOMINAL AORTIC ANEURYSM (AAA) WITHOUT RUPTURE: ICD-10-CM

## 2023-11-03 DIAGNOSIS — I50.32 HEART FAILURE, DIASTOLIC, CHRONIC: ICD-10-CM

## 2023-11-03 DIAGNOSIS — Z79.01 CHRONIC ANTICOAGULATION: ICD-10-CM

## 2023-11-03 DIAGNOSIS — Z85.3 HISTORY OF BREAST CANCER: ICD-10-CM

## 2023-11-03 DIAGNOSIS — I48.21 PERMANENT ATRIAL FIBRILLATION: ICD-10-CM

## 2023-11-03 PROCEDURE — 1159F PR MEDICATION LIST DOCUMENTED IN MEDICAL RECORD: ICD-10-PCS | Mod: CPTII,S$GLB,, | Performed by: INTERNAL MEDICINE

## 2023-11-03 PROCEDURE — 99999 PR PBB SHADOW E&M-EST. PATIENT-LVL III: ICD-10-PCS | Mod: PBBFAC,,, | Performed by: INTERNAL MEDICINE

## 2023-11-03 PROCEDURE — 1126F PR PAIN SEVERITY QUANTIFIED, NO PAIN PRESENT: ICD-10-PCS | Mod: CPTII,S$GLB,, | Performed by: INTERNAL MEDICINE

## 2023-11-03 PROCEDURE — 3078F PR MOST RECENT DIASTOLIC BLOOD PRESSURE < 80 MM HG: ICD-10-PCS | Mod: CPTII,S$GLB,, | Performed by: INTERNAL MEDICINE

## 2023-11-03 PROCEDURE — 1160F RVW MEDS BY RX/DR IN RCRD: CPT | Mod: CPTII,S$GLB,, | Performed by: INTERNAL MEDICINE

## 2023-11-03 PROCEDURE — 99214 PR OFFICE/OUTPT VISIT, EST, LEVL IV, 30-39 MIN: ICD-10-PCS | Mod: S$GLB,,, | Performed by: INTERNAL MEDICINE

## 2023-11-03 PROCEDURE — 3074F SYST BP LT 130 MM HG: CPT | Mod: CPTII,S$GLB,, | Performed by: INTERNAL MEDICINE

## 2023-11-03 PROCEDURE — 1159F MED LIST DOCD IN RCRD: CPT | Mod: CPTII,S$GLB,, | Performed by: INTERNAL MEDICINE

## 2023-11-03 PROCEDURE — 1160F PR REVIEW ALL MEDS BY PRESCRIBER/CLIN PHARMACIST DOCUMENTED: ICD-10-PCS | Mod: CPTII,S$GLB,, | Performed by: INTERNAL MEDICINE

## 2023-11-03 PROCEDURE — 3078F DIAST BP <80 MM HG: CPT | Mod: CPTII,S$GLB,, | Performed by: INTERNAL MEDICINE

## 2023-11-03 PROCEDURE — 99214 OFFICE O/P EST MOD 30 MIN: CPT | Mod: S$GLB,,, | Performed by: INTERNAL MEDICINE

## 2023-11-03 PROCEDURE — 99999 PR PBB SHADOW E&M-EST. PATIENT-LVL III: CPT | Mod: PBBFAC,,, | Performed by: INTERNAL MEDICINE

## 2023-11-03 PROCEDURE — 1126F AMNT PAIN NOTED NONE PRSNT: CPT | Mod: CPTII,S$GLB,, | Performed by: INTERNAL MEDICINE

## 2023-11-03 PROCEDURE — 3074F PR MOST RECENT SYSTOLIC BLOOD PRESSURE < 130 MM HG: ICD-10-PCS | Mod: CPTII,S$GLB,, | Performed by: INTERNAL MEDICINE

## 2023-11-03 RX ORDER — METOPROLOL SUCCINATE 25 MG/1
25 TABLET, EXTENDED RELEASE ORAL 2 TIMES DAILY
Qty: 180 TABLET | Refills: 3 | Status: SHIPPED | OUTPATIENT
Start: 2023-11-03

## 2023-11-03 RX ORDER — SPIRONOLACTONE 25 MG/1
25 TABLET ORAL DAILY
Qty: 90 TABLET | Refills: 3 | Status: SHIPPED | OUTPATIENT
Start: 2023-11-03

## 2023-11-03 RX ORDER — EZETIMIBE 10 MG/1
10 TABLET ORAL DAILY
Qty: 90 TABLET | Refills: 3 | Status: SHIPPED | OUTPATIENT
Start: 2023-11-03

## 2023-11-03 RX ORDER — ATORVASTATIN CALCIUM 80 MG/1
80 TABLET, FILM COATED ORAL DAILY
Qty: 90 TABLET | Refills: 3 | Status: SHIPPED | OUTPATIENT
Start: 2023-11-03

## 2023-11-03 RX ORDER — VALSARTAN 160 MG/1
160 TABLET ORAL DAILY
Qty: 90 TABLET | Refills: 3 | Status: SHIPPED | OUTPATIENT
Start: 2023-11-03

## 2023-11-03 RX ORDER — HYDROCHLOROTHIAZIDE 12.5 MG/1
12.5 CAPSULE ORAL DAILY
Qty: 90 CAPSULE | Refills: 3 | Status: SHIPPED | OUTPATIENT
Start: 2023-11-03

## 2023-11-03 NOTE — PROGRESS NOTES
Subjective:     Shanice Grewal is a 83 y.o. female with hypertension and hypercholesterolemia. In 2019 she was told she may have prediabetes. She is mildly obese. She has hypertensive heart disease with moderate diastolic dysfunction. She had an episode of chest pain when shopping on 11/14/2013. An ambulance was called for and she was checked at her home. She was advised to follow up with her doctor as an outpatient. She underwent a stress echocardiogram in 11/2013 that was fine. She was diagnosed with esophageal reflux disease in 4/2014. After she began a proton pump inhibitor and sucralfate her epigastric pain resolved. In 5/2015 she was seen in the emergency room at [facility] for atypical chest pain. She underwent a stress echocardiogram in 6/2015 during which she was able to exercise for 6:00 minutes. There was no chest pain and the electrocardiogram was negative. The echocardiogram was negative as well except for a mildly sigmoid septum. The chest pain later resolved. In 5/2018 she had gynecological surgery. On 9/9/2019 she had onset of palpitations and was diagnosed with atrial fibrillation. On 9/10/2019 she underwent electrical cardioversion. She has a [id number] score of 6 ([id number]) and was begun on apixiban. In 11/2019 she was diagnosed with breast cancer of the right breast. She underwent right mastectomy on 12/5/2019. In 3/2022 she was bothered by an upper respiratory in infection and has had occasional wheezing. She used inhalers. On 8/5/2022 she was again noted to be in atrial fibrillation and it then appeared that the atrial fibrillation had become chronic. She continues to occasionally experience a warm feeling in the right calf that can come on at essentially any time and does not appear related to activities. No exertional chest pain but mild exertional dyspnea. No palpitations or weak spells. No bleeding. No issues with any of her prescribed medications. Feeling well  overall.      Congestive Heart Failure  Presents for follow-up visit. The disease course has been stable. The condition has lasted for  years. Pertinent negatives include no abdominal pain, chest pain, chest pressure, claudication, edema, fatigue, muscle weakness, near-syncope, nocturia, orthopnea, palpitations, paroxysmal nocturnal dyspnea, shortness of breath or unexpected weight change. The symptoms have been stable.   Chest Pain   This is a chronic problem. The current episode started more than 1 year ago. The problem has been resolved. Pertinent negatives include no abdominal pain, back pain, claudication, cough, diaphoresis, dizziness, exertional chest pressure, fever, headaches, hemoptysis, irregular heartbeat, leg pain, lower extremity edema, malaise/fatigue, nausea, near-syncope, numbness, orthopnea, palpitations, PND, shortness of breath, sputum production, syncope, vomiting or weakness. The pain is aggravated by nothing.   Her past medical history is significant for CHF, hyperlipidemia and hypertension.   Pertinent negatives for past medical history include no diabetes and no muscle weakness.   Atrial Fibrillation  Presents for follow-up visit. Symptoms include hypertension. Symptoms are negative for bradycardia, chest pain, dizziness, hypotension, palpitations, shortness of breath, syncope, tachycardia and weakness. The symptoms have been stable. Past medical history includes CHF and hyperlipidemia.   Hypertension  This is a chronic problem. The current episode started more than 1 year ago. The problem is unchanged. The problem is controlled (usually 120-130/70-80 mmHg at home). Pertinent negatives include no anxiety, blurred vision, chest pain, headaches, malaise/fatigue, neck pain, orthopnea, palpitations, peripheral edema, PND, shortness of breath or sweats. There is no history of chronic renal disease.   Hyperlipidemia  This is a chronic problem. The current episode started more than 1 year ago. The  problem is controlled. Recent lipid tests were reviewed and are normal. Exacerbating diseases include obesity. She has no history of chronic renal disease, diabetes, hypothyroidism, liver disease or nephrotic syndrome. Pertinent negatives include no chest pain, focal sensory loss, focal weakness, leg pain, myalgias or shortness of breath.       Review of Systems   Constitutional: Negative for diaphoresis, fatigue, fever, malaise/fatigue and unexpected weight change.   HENT:  Negative for nosebleeds.    Eyes:  Negative for blurred vision, pain, vision loss in left eye and vision loss in right eye.   Cardiovascular:  Positive for dyspnea on exertion. Negative for chest pain, claudication, irregular heartbeat, leg swelling, near-syncope, orthopnea, palpitations, paroxysmal nocturnal dyspnea and syncope.   Respiratory:  Negative for cough, hemoptysis, shortness of breath, sputum production and wheezing.    Endocrine: Negative for cold intolerance, heat intolerance and polydipsia.   Hematologic/Lymphatic: Negative for bleeding problem. Does not bruise/bleed easily.   Skin:  Negative for color change and rash.   Musculoskeletal:  Positive for arthritis. Negative for back pain, falls, joint pain, muscle weakness, myalgias and neck pain.   Gastrointestinal:  Negative for abdominal pain, heartburn, hematemesis, hematochezia, hemorrhoids, jaundice, melena, nausea and vomiting.   Genitourinary:  Negative for dysuria, hematuria and nocturia.   Neurological:  Negative for dizziness, focal weakness, headaches, light-headedness, loss of balance, numbness, tremors, vertigo and weakness.   Psychiatric/Behavioral:  Negative for altered mental status, depression and memory loss. The patient is not nervous/anxious.    Allergic/Immunologic: Negative for hives and persistent infections.       Current Outpatient Medications on File Prior to Visit   Medication Sig Dispense Refill    apixaban (ELIQUIS) 5 mg Tab Take 1 tablet (5 mg total) by  "mouth 2 (two) times daily. 180 tablet 3    atorvastatin (LIPITOR) 80 MG tablet Take 1 tablet (80 mg total) by mouth once daily. 90 tablet 3    ezetimibe (ZETIA) 10 mg tablet Take 1 tablet (10 mg total) by mouth once daily. 90 tablet 3    gabapentin (NEURONTIN) 100 MG capsule Take 100 mg by mouth 3 (three) times daily.      hydroCHLOROthiazide (MICROZIDE) 12.5 mg capsule Take 1 capsule (12.5 mg total) by mouth once daily. 90 capsule 3    LORazepam (ATIVAN) 1 MG tablet Take 1 mg by mouth every 12 (twelve) hours as needed for Anxiety.      metoprolol succinate (TOPROL-XL) 25 MG 24 hr tablet Take 1 tablet (25 mg total) by mouth 2 (two) times daily. 180 tablet 3    omeprazole (PRILOSEC) 20 MG capsule Take 20 mg by mouth once daily.      spironolactone (ALDACTONE) 25 MG tablet Take 1 tablet (25 mg total) by mouth once daily. 90 tablet 3    valsartan (DIOVAN) 160 MG tablet Take 1 tablet (160 mg total) by mouth once daily. 90 tablet 3    azelastine (ASTELIN) 137 mcg (0.1 %) nasal spray 1 spray by Nasal route 2 (two) times daily.      ergocalciferol (ERGOCALCIFEROL) 50,000 unit Cap Take 50,000 Units by mouth every 14 (fourteen) days.      fluticasone (FLONASE) 50 mcg/actuation nasal spray       FLUVIRIN [id number] 45 mcg (15 mcg x 3)/0.5 mL Susp       metoclopramide HCl (REGLAN) 10 MG tablet       omega-3 fatty acids/fish oil (FISH OIL-OMEGA-3 FATTY ACIDS) 300-1,000 mg capsule Take 1 capsule by mouth once daily. (Patient not taking: Reported on 8/5/2022) 90 capsule 3    PATANOL 0.1 % ophthalmic solution       sucralfate (CARAFATE) 1 gram tablet Take 1 g by mouth 4 (four) times daily before meals and nightly.        No current facility-administered medications on file prior to visit.       BP 96/66   Pulse 81   Ht 5' 8" (1.727 m)   Wt 101.2 kg (223 lb 3.5 oz)   SpO2 96%   BMI 33.94 kg/m²     Objective:     Physical Exam  Constitutional:       General: She is not in acute distress.     Appearance: Normal appearance. She is " well-developed. She is not ill-appearing or diaphoretic.   HENT:      Head: Normocephalic and atraumatic.      Nose: Nose normal.   Eyes:      General:         Right eye: No discharge.         Left eye: No discharge.      Conjunctiva/sclera:      Right eye: No hemorrhage.     Left eye: No hemorrhage.     Pupils: Pupils are equal.      Right eye: Pupil is round.      Left eye: Pupil is round.   Neck:      Thyroid: No thyromegaly.      Vascular: No carotid bruit or JVD.   Cardiovascular:      Rate and Rhythm: Normal rate. Rhythm irregularly irregular. No extrasystoles are present.     Chest Wall: PMI is not displaced.      Pulses:           Radial pulses are 2+ on the right side and 2+ on the left side.        Dorsalis pedis pulses are 1+ on the right side and 1+ on the left side.        Posterior tibial pulses are 1+ on the right side and 1+ on the left side.      Heart sounds: S1 normal and S2 normal. Murmur heard.      Harsh midsystolic murmur is present with a grade of 2/6 at the upper right sternal border.   Pulmonary:      Effort: Pulmonary effort is normal.      Breath sounds: Normal breath sounds.   Abdominal:      Palpations: Abdomen is soft.      Tenderness: There is no abdominal tenderness.   Musculoskeletal:      Cervical back: Neck supple.      Right ankle: No swelling, deformity or ecchymosis.      Left ankle: No swelling, deformity or ecchymosis.   Lymphadenopathy:      Head:      Right side of head: No submandibular adenopathy.      Left side of head: No submandibular adenopathy.      Cervical: No cervical adenopathy.   Skin:     General: Skin is warm.      Findings: No rash.   Neurological:      General: No focal deficit present.      Mental Status: She is alert and oriented to person, place, and time. She is not disoriented.      Cranial Nerves: No cranial nerve deficit.   Psychiatric:         Attention and Perception: Attention and perception normal.         Mood and Affect: Mood and affect normal.          Speech: Speech normal.         Behavior: Behavior normal.         Thought Content: Thought content normal.         Cognition and Memory: Cognition and memory normal.         Judgment: Judgment normal.         Assessment:      1. Heart failure, diastolic, chronic    2. History of chest pain    3. Permanent atrial fibrillation    4. Chronic anticoagulation    5. Infrarenal abdominal aortic aneurysm (AAA) without rupture    6. Primary hypertension    7. Hypercholesterolemia    8. Mild obesity    9. Gastroesophageal reflux disease without esophagitis    10. History of breast cancer        Plan:     1. Heart Failure, Diastolic, Chronic   2/21/2011: Echo: Moderate left ventricular hypertrophy. Moderate diastolic dysfunction. Mild to moderate mitral regurgitation.   4/19/2023: Lisinopril 10 mg Q24 was changed to valsartan 160 mg Q24 due to cough.   On metoprolol 25 mg Q12, valsartan 160 mg Q24, spironolactone 25 mg Q24 and hctz 12.5 mg Q24.   Well compensated.    2. History of Chest Pain   11/15/2013: ECG: NSR. Left axis deviation.   11/25/2013: Stress Echo: 6:30 min. Negative.   5/19/2015: Seen at [facility] ER for atypical chest pain.   6/2/2015: Stress Echo: 6:00 min. No CP. ECG negative. Echo negative. Mildly sigmoid septum.   3/15/2016: Recurrent atypical chest pain.   Appeared chest pain was non cardiac in origin.   Resolved.   Reassurance.    3. Atrial Fibrillation   9/9/2019: Diagnosed with paroxysmal atrial fibrillation.   [id number]=0 ([id number]).   9/10/2019: CV.   8/5/2022: In atrial fibrillation - appeared atrial fibrillation had become chronic.   Rate control strategy.   On apixiban.   On metoprolol 25 mg Q12.   Rate appears well controlled.     4. Chronic Anticoagulation   9/9/2019: Diagnosed with paroxysmal atrial fibrillation.   [id number]=1 ([id number]).   On apixiban 5 mg Q12.   No aspirin or NSAID.   No bleeding.    5. Abdominal Aortic Aneurysm   1/24/2018: CT abdomen: Small AAA 3.1 cm.   1/2/2019: U/S  AAA: Small AAA 2.5 cm x 3.0 cm.   6/22/2020: U/S AAA: Small AAA: 3.0 cm x 3.1 cm.   7/6/2021: U/S AAA: Small AAA: 2.8 x 2.9 cm. LCIA: Severe stenosis: 3.5 m/s.   7/6/2022: U/S AAA: Small AAA: 2.6 x 3.2 cm. RCIA: Severe stenosis - 2.7 m/s. LCIA: Severe stenosis - 3.5 m/s.   8/10/2023: U/S AAA: Ectatic AA: 2.7 x 2.8 cm. RCIA: Severe stenosis - 2.7 m/s. LCIA: Severe stenosis - 3.4 m/s.   She has no claudication therefore no intervention.   8/2024: Plan next U/S AAA. Then yearly.     6. Hypertension   2000: Diagnosed.   4/19/2023: Lisinopril 10 mg Q24 was changed to valsartan 160 mg Q24 due to cough.   On metoprolol 25 mg Q12, valsartan 160 mg Q24, spironolactone 25 mg Q24 and hctz 12.5 mg Q24.   Keeping log at home.    Well controlled.    7. Hypercholesterolemia   2005: Began statin.   On atorvastatin 80 mg Q24 and ezetimide 10 mg Q24.   Tells me well controlled.     8. Mild Obesity   4/10/2017: Weight 98 kg. BMI 32.   10/23/2020: Weight 106 kg. BMI 36.   2/26/2021: Weight 107 kg. BMI 36.   3/21/2022: Weight 109 kg. BMI 37.   6/30/2023: Weight 101 kg. BMI 34.   Encouraged to lose weight.    9. Gastroesophageal Reflux Disease    4/2014: Diagnosed.   On omeprazole 20 mg Q24.   Seldom bothers her.    10. History of Breast Cancer   11/2019: Diagnosed. Right breast.   12/5/2019: Right mastectomy.   Dr. Kennedy John, III..   Dr. Erich Albrecht.    11. Bronchitis   3/2022: Has had UTI.   On inhalers.   Dr. Dru Nam.     12. Primary Care   Dr. Magdiel Deutsch.    F/u 4 months.    Mira Mcknight M.D.

## 2024-03-20 ENCOUNTER — OFFICE VISIT (OUTPATIENT)
Dept: CARDIOLOGY | Facility: CLINIC | Age: 84
End: 2024-03-20
Attending: INTERNAL MEDICINE
Payer: MEDICARE

## 2024-03-20 VITALS
HEIGHT: 68 IN | BODY MASS INDEX: 34.03 KG/M2 | HEART RATE: 93 BPM | SYSTOLIC BLOOD PRESSURE: 104 MMHG | WEIGHT: 224.56 LBS | DIASTOLIC BLOOD PRESSURE: 67 MMHG | OXYGEN SATURATION: 95 %

## 2024-03-20 DIAGNOSIS — I10 PRIMARY HYPERTENSION: ICD-10-CM

## 2024-03-20 DIAGNOSIS — E66.9 MILD OBESITY: ICD-10-CM

## 2024-03-20 DIAGNOSIS — I71.43 INFRARENAL ABDOMINAL AORTIC ANEURYSM (AAA) WITHOUT RUPTURE: ICD-10-CM

## 2024-03-20 DIAGNOSIS — Z87.898 HISTORY OF CHEST PAIN: ICD-10-CM

## 2024-03-20 DIAGNOSIS — I50.32 HEART FAILURE, DIASTOLIC, CHRONIC: ICD-10-CM

## 2024-03-20 DIAGNOSIS — Z85.3 HISTORY OF BREAST CANCER: ICD-10-CM

## 2024-03-20 DIAGNOSIS — Z79.01 CHRONIC ANTICOAGULATION: ICD-10-CM

## 2024-03-20 DIAGNOSIS — I48.21 PERMANENT ATRIAL FIBRILLATION: ICD-10-CM

## 2024-03-20 DIAGNOSIS — K21.9 GASTROESOPHAGEAL REFLUX DISEASE WITHOUT ESOPHAGITIS: ICD-10-CM

## 2024-03-20 PROCEDURE — 99999 PR PBB SHADOW E&M-EST. PATIENT-LVL IV: CPT | Mod: PBBFAC,,, | Performed by: INTERNAL MEDICINE

## 2024-03-20 PROCEDURE — 93000 ELECTROCARDIOGRAM COMPLETE: CPT | Mod: S$GLB,,, | Performed by: INTERNAL MEDICINE

## 2024-03-20 PROCEDURE — 99214 OFFICE O/P EST MOD 30 MIN: CPT | Mod: 25,S$GLB,, | Performed by: INTERNAL MEDICINE

## 2024-03-20 PROCEDURE — 93005 ELECTROCARDIOGRAM TRACING: CPT

## 2024-03-20 NOTE — PROGRESS NOTES
Subjective:     Shanice Grewal is a 84 y.o. female with hypertension and hypercholesterolemia. In 2019 she was told she may have prediabetes. She is mildly obese. She has hypertensive heart disease with moderate diastolic dysfunction. She had an episode of chest pain when shopping on 11/14/2013. An ambulance was called for and she was checked at her home. She was advised to follow up with her doctor as an outpatient. She underwent a stress echocardiogram in 11/2013 that was fine. She was diagnosed with esophageal reflux disease in 4/2014. After she began a proton pump inhibitor and sucralfate her epigastric pain resolved. In 5/2015 she was seen in the emergency room at [facility] for atypical chest pain. She underwent a stress echocardiogram in 6/2015 during which she was able to exercise for 6:00 minutes. There was no chest pain and the electrocardiogram was negative. The echocardiogram was negative as well except for a mildly sigmoid septum. The chest pain later resolved. In 5/2018 she had gynecological surgery. On 9/9/2019 she had onset of palpitations and was diagnosed with atrial fibrillation. On 9/10/2019 she underwent electrical cardioversion. She has a [id number] score of 6 ([id number]) and was begun on apixiban. In 11/2019 she was diagnosed with breast cancer of the right breast. She underwent right mastectomy on 12/5/2019. In 3/2022 she was bothered by an upper respiratory in infection and has had occasional wheezing. She used inhalers. On 8/5/2022 she was again noted to be in atrial fibrillation and it then appeared that the atrial fibrillation had become chronic. She continues to occasionally experience a warm feeling in the right calf that can come on at essentially any time and does not appear related to activities. No exertional chest pain but mild exertional dyspnea. No palpitations or weak spells. No bleeding. No issues with any of her prescribed medications. Feeling well  overall.      Congestive Heart Failure  Presents for follow-up visit. The disease course has been stable. The condition has lasted for  years. Pertinent negatives include no abdominal pain, chest pain, chest pressure, claudication, edema, fatigue, muscle weakness, near-syncope, nocturia, orthopnea, palpitations, paroxysmal nocturnal dyspnea, shortness of breath or unexpected weight change. The symptoms have been stable.   Chest Pain   This is a chronic problem. The current episode started more than 1 year ago. The problem has been resolved. Pertinent negatives include no abdominal pain, back pain, claudication, cough, diaphoresis, dizziness, exertional chest pressure, fever, headaches, hemoptysis, irregular heartbeat, leg pain, lower extremity edema, malaise/fatigue, nausea, near-syncope, numbness, orthopnea, palpitations, PND, shortness of breath, sputum production, syncope, vomiting or weakness. The pain is aggravated by nothing.   Her past medical history is significant for CHF, hyperlipidemia and hypertension.   Pertinent negatives for past medical history include no diabetes and no muscle weakness.   Atrial Fibrillation  Presents for follow-up visit. Symptoms include hypertension. Symptoms are negative for bradycardia, chest pain, dizziness, hypotension, palpitations, shortness of breath, syncope, tachycardia and weakness. The symptoms have been stable. Past medical history includes CHF and hyperlipidemia.   Hypertension  This is a chronic problem. The current episode started more than 1 year ago. The problem is unchanged. The problem is controlled (usually 120-130/70-80 mmHg at home). Pertinent negatives include no anxiety, blurred vision, chest pain, headaches, malaise/fatigue, neck pain, orthopnea, palpitations, peripheral edema, PND, shortness of breath or sweats. There is no history of chronic renal disease.   Hyperlipidemia  This is a chronic problem. The current episode started more than 1 year ago. The  problem is controlled. Recent lipid tests were reviewed and are normal. Exacerbating diseases include obesity. She has no history of chronic renal disease, diabetes, hypothyroidism, liver disease or nephrotic syndrome. Pertinent negatives include no chest pain, focal sensory loss, focal weakness, leg pain, myalgias or shortness of breath.       Review of Systems   Constitutional: Negative for diaphoresis, fatigue, fever, malaise/fatigue and unexpected weight change.   HENT:  Negative for nosebleeds.    Eyes:  Negative for blurred vision, pain, vision loss in left eye and vision loss in right eye.   Cardiovascular:  Positive for dyspnea on exertion. Negative for chest pain, claudication, irregular heartbeat, leg swelling, near-syncope, orthopnea, palpitations, paroxysmal nocturnal dyspnea and syncope.   Respiratory:  Negative for cough, hemoptysis, shortness of breath, sputum production and wheezing.    Endocrine: Negative for cold intolerance, heat intolerance and polydipsia.   Hematologic/Lymphatic: Negative for bleeding problem. Does not bruise/bleed easily.   Skin:  Negative for color change and rash.   Musculoskeletal:  Positive for arthritis. Negative for back pain, falls, joint pain, muscle weakness, myalgias and neck pain.   Gastrointestinal:  Negative for abdominal pain, heartburn, hematemesis, hematochezia, hemorrhoids, jaundice, melena, nausea and vomiting.   Genitourinary:  Negative for dysuria, hematuria and nocturia.   Neurological:  Negative for dizziness, focal weakness, headaches, light-headedness, loss of balance, numbness, tremors, vertigo and weakness.   Psychiatric/Behavioral:  Negative for altered mental status, depression and memory loss. The patient is not nervous/anxious.    Allergic/Immunologic: Negative for hives and persistent infections.       Current Outpatient Medications on File Prior to Visit   Medication Sig Dispense Refill    apixaban (ELIQUIS) 5 mg Tab Take 1 tablet (5 mg total) by  "mouth 2 (two) times daily. 180 tablet 3    atorvastatin (LIPITOR) 80 MG tablet Take 1 tablet (80 mg total) by mouth once daily. 90 tablet 3    ergocalciferol (ERGOCALCIFEROL) 50,000 unit Cap Take 50,000 Units by mouth every 14 (fourteen) days.      ezetimibe (ZETIA) 10 mg tablet Take 1 tablet (10 mg total) by mouth once daily. 90 tablet 3    FLUVIRIN [id number] 45 mcg (15 mcg x 3)/0.5 mL Susp       gabapentin (NEURONTIN) 100 MG capsule Take 100 mg by mouth 3 (three) times daily.      hydroCHLOROthiazide (MICROZIDE) 12.5 mg capsule Take 1 capsule (12.5 mg total) by mouth once daily. 90 capsule 3    LORazepam (ATIVAN) 1 MG tablet Take 1 mg by mouth every 12 (twelve) hours as needed for Anxiety.      metoprolol succinate (TOPROL-XL) 25 MG 24 hr tablet Take 1 tablet (25 mg total) by mouth 2 (two) times daily. 180 tablet 3    omeprazole (PRILOSEC) 20 MG capsule Take 20 mg by mouth once daily.      PATANOL 0.1 % ophthalmic solution       spironolactone (ALDACTONE) 25 MG tablet Take 1 tablet (25 mg total) by mouth once daily. 90 tablet 3    valsartan (DIOVAN) 160 MG tablet Take 1 tablet (160 mg total) by mouth once daily. 90 tablet 3    azelastine (ASTELIN) 137 mcg (0.1 %) nasal spray 1 spray by Nasal route 2 (two) times daily.      fluticasone (FLONASE) 50 mcg/actuation nasal spray       metoclopramide HCl (REGLAN) 10 MG tablet       omega-3 fatty acids/fish oil (FISH OIL-OMEGA-3 FATTY ACIDS) 300-1,000 mg capsule Take 1 capsule by mouth once daily. (Patient not taking: Reported on 8/5/2022) 90 capsule 3    sucralfate (CARAFATE) 1 gram tablet Take 1 g by mouth 4 (four) times daily before meals and nightly.        No current facility-administered medications on file prior to visit.       /67   Pulse 93   Ht 5' 8" (1.727 m)   Wt 101.9 kg (224 lb 8.6 oz)   SpO2 95%   BMI 34.14 kg/m²     Objective:     Physical Exam  Constitutional:       General: She is not in acute distress.     Appearance: Normal appearance. She " is well-developed. She is not ill-appearing or diaphoretic.   HENT:      Head: Normocephalic and atraumatic.      Nose: Nose normal.   Eyes:      General:         Right eye: No discharge.         Left eye: No discharge.      Conjunctiva/sclera:      Right eye: No hemorrhage.     Left eye: No hemorrhage.     Pupils: Pupils are equal.      Right eye: Pupil is round.      Left eye: Pupil is round.   Neck:      Thyroid: No thyromegaly.      Vascular: No carotid bruit or JVD.   Cardiovascular:      Rate and Rhythm: Normal rate. Rhythm irregularly irregular. No extrasystoles are present.     Chest Wall: PMI is not displaced.      Pulses:           Radial pulses are 2+ on the right side and 2+ on the left side.        Dorsalis pedis pulses are 1+ on the right side and 1+ on the left side.        Posterior tibial pulses are 1+ on the right side and 1+ on the left side.      Heart sounds: S1 normal and S2 normal. Murmur heard.      Harsh midsystolic murmur is present with a grade of 2/6 at the upper right sternal border.   Pulmonary:      Effort: Pulmonary effort is normal.      Breath sounds: Normal breath sounds.   Abdominal:      Palpations: Abdomen is soft.      Tenderness: There is no abdominal tenderness.   Musculoskeletal:      Cervical back: Neck supple.      Right ankle: No swelling, deformity or ecchymosis.      Left ankle: No swelling, deformity or ecchymosis.   Lymphadenopathy:      Head:      Right side of head: No submandibular adenopathy.      Left side of head: No submandibular adenopathy.      Cervical: No cervical adenopathy.   Skin:     General: Skin is warm.      Findings: No rash.   Neurological:      General: No focal deficit present.      Mental Status: She is alert and oriented to person, place, and time. She is not disoriented.      Cranial Nerves: No cranial nerve deficit.   Psychiatric:         Attention and Perception: Attention and perception normal.         Mood and Affect: Mood and affect  normal.         Speech: Speech normal.         Behavior: Behavior normal.         Thought Content: Thought content normal.         Cognition and Memory: Cognition and memory normal.         Judgment: Judgment normal.         Assessment:      1. Heart failure, diastolic, chronic    2. History of chest pain    3. Permanent atrial fibrillation    4. Chronic anticoagulation    5. Infrarenal abdominal aortic aneurysm (AAA) without rupture    6. Primary hypertension    7. Mild obesity    8. Gastroesophageal reflux disease without esophagitis    9. History of breast cancer        Plan:     1. Heart Failure, Diastolic, Chronic   2/21/2011: Echo: Moderate left ventricular hypertrophy. Moderate diastolic dysfunction. Mild to moderate mitral regurgitation.   4/19/2023: Lisinopril 10 mg Q24 was changed to valsartan 160 mg Q24 due to cough.   On metoprolol 25 mg Q12, valsartan 160 mg Q24, spironolactone 25 mg Q24 and hctz 12.5 mg Q24.   Well compensated.    2. History of Chest Pain   11/15/2013: ECG: NSR. Left axis deviation.   11/25/2013: Stress Echo: 6:30 min. Negative.   5/19/2015: Seen at [facility] ER for atypical chest pain.   6/2/2015: Stress Echo: 6:00 min. No CP. ECG negative. Echo negative. Mildly sigmoid septum.   3/15/2016: Recurrent atypical chest pain.   Appeared chest pain was non cardiac in origin.   Resolved.   Reassurance.    3. Atrial Fibrillation   9/9/2019: Diagnosed with paroxysmal atrial fibrillation.   [id number]=4 ([id number]).   9/10/2019: CV.   8/5/2022: In atrial fibrillation - appeared atrial fibrillation had become chronic.   Rate control strategy.   On apixiban.   On metoprolol 25 mg Q12.   Rate appears well controlled.     4. Chronic Anticoagulation   9/9/2019: Diagnosed with paroxysmal atrial fibrillation.   [id number]=4 ([id number]).   On apixiban 5 mg Q12.   No aspirin or NSAID.   No bleeding.    5. Abdominal Aortic Aneurysm   1/24/2018: CT abdomen: Small AAA 3.1 cm.   1/2/2019: U/S AAA: Small AAA 2.5  cm x 3.0 cm.   6/22/2020: U/S AAA: Small AAA: 3.0 cm x 3.1 cm.   7/6/2021: U/S AAA: Small AAA: 2.8 x 2.9 cm. LCIA: Severe stenosis: 3.5 m/s.   7/6/2022: U/S AAA: Small AAA: 2.6 x 3.2 cm. RCIA: Severe stenosis - 2.7 m/s. LCIA: Severe stenosis - 3.5 m/s.   8/10/2023: U/S AAA: Ectatic AA: 2.7 x 2.8 cm. RCIA: Severe stenosis - 2.7 m/s. LCIA: Severe stenosis - 3.4 m/s.   She has no claudication therefore no intervention.   8/2024: Plan next U/S AAA. Then yearly.     6. Hypertension   2000: Diagnosed.   4/19/2023: Lisinopril 10 mg Q24 was changed to valsartan 160 mg Q24 due to cough.   On metoprolol 25 mg Q12, valsartan 160 mg Q24, spironolactone 25 mg Q24 and hctz 12.5 mg Q24.   Keeping log at home.    Well controlled.    7. Hypercholesterolemia   2005: Began statin.   On atorvastatin 80 mg Q24 and ezetimide 10 mg Q24.   1/18/2022: Chol 130. HDL 37. LDL 80. TG 58.   On atorvastatin 80 mg Q24 and ezetimide 10 mg Q24.   Favorable lipid panel.     8. Mild Obesity   4/10/2017: Weight 98 kg. BMI 32.   10/23/2020: Weight 106 kg. BMI 36.   2/26/2021: Weight 107 kg. BMI 36.   3/21/2022: Weight 109 kg. BMI 37.   6/30/2023: Weight 101 kg. BMI 34.   Encouraged to lose weight.    9. Gastroesophageal Reflux Disease    4/2014: Diagnosed.   On omeprazole 20 mg Q24.   Seldom bothers her.    10. History of Breast Cancer   11/2019: Diagnosed. Right breast.   12/5/2019: Right mastectomy.   Dr. Kennedy John, III..   Dr. Erich Albrecht.    11. Bronchitis   3/2022: Has had UTI.   On inhalers.   Dr. Dru Nam.     12. Primary Care   Dr. Magdiel Deutsch.    F/u 4 months.    Mira Mcknight M.D.

## 2024-03-21 LAB
OHS QRS DURATION: 102 MS
OHS QTC CALCULATION: 436 MS

## 2024-08-07 ENCOUNTER — OFFICE VISIT (OUTPATIENT)
Dept: CARDIOLOGY | Facility: CLINIC | Age: 84
End: 2024-08-07
Attending: INTERNAL MEDICINE
Payer: MEDICARE

## 2024-08-07 DIAGNOSIS — Z85.3 HISTORY OF BREAST CANCER: ICD-10-CM

## 2024-08-07 DIAGNOSIS — E66.9 MILD OBESITY: ICD-10-CM

## 2024-08-07 DIAGNOSIS — E78.00 HYPERCHOLESTEROLEMIA: ICD-10-CM

## 2024-08-07 DIAGNOSIS — K21.9 GASTROESOPHAGEAL REFLUX DISEASE WITHOUT ESOPHAGITIS: ICD-10-CM

## 2024-08-07 DIAGNOSIS — I10 PRIMARY HYPERTENSION: ICD-10-CM

## 2024-08-07 DIAGNOSIS — I50.32 HEART FAILURE, DIASTOLIC, CHRONIC: ICD-10-CM

## 2024-08-07 DIAGNOSIS — Z87.898 HISTORY OF CHEST PAIN: ICD-10-CM

## 2024-08-07 DIAGNOSIS — I48.21 PERMANENT ATRIAL FIBRILLATION: ICD-10-CM

## 2024-08-07 DIAGNOSIS — Z79.01 CHRONIC ANTICOAGULATION: ICD-10-CM

## 2024-08-07 DIAGNOSIS — I71.43 INFRARENAL ABDOMINAL AORTIC ANEURYSM (AAA) WITHOUT RUPTURE: ICD-10-CM

## 2024-08-07 PROCEDURE — 3288F FALL RISK ASSESSMENT DOCD: CPT | Mod: CPTII,S$GLB,, | Performed by: INTERNAL MEDICINE

## 2024-08-07 PROCEDURE — 99214 OFFICE O/P EST MOD 30 MIN: CPT | Mod: S$GLB,,, | Performed by: INTERNAL MEDICINE

## 2024-08-07 PROCEDURE — 1160F RVW MEDS BY RX/DR IN RCRD: CPT | Mod: CPTII,S$GLB,, | Performed by: INTERNAL MEDICINE

## 2024-08-07 PROCEDURE — 99999 PR PBB SHADOW E&M-EST. PATIENT-LVL III: CPT | Mod: PBBFAC,,, | Performed by: INTERNAL MEDICINE

## 2024-08-07 PROCEDURE — 1126F AMNT PAIN NOTED NONE PRSNT: CPT | Mod: CPTII,S$GLB,, | Performed by: INTERNAL MEDICINE

## 2024-08-07 PROCEDURE — 1159F MED LIST DOCD IN RCRD: CPT | Mod: CPTII,S$GLB,, | Performed by: INTERNAL MEDICINE

## 2024-08-07 PROCEDURE — 1101F PT FALLS ASSESS-DOCD LE1/YR: CPT | Mod: CPTII,S$GLB,, | Performed by: INTERNAL MEDICINE

## 2024-08-16 ENCOUNTER — HOSPITAL ENCOUNTER (OUTPATIENT)
Dept: CARDIOLOGY | Facility: OTHER | Age: 84
Discharge: HOME | End: 2024-08-16
Attending: INTERNAL MEDICINE
Payer: MEDICARE

## 2024-08-16 DIAGNOSIS — I71.43 INFRARENAL ABDOMINAL AORTIC ANEURYSM (AAA) WITHOUT RUPTURE: ICD-10-CM

## 2024-08-16 LAB
ABDOMINAL INFRARENAL AORTA AP: 2.6 CM
ABDOMINAL INFRARENAL AORTA ED VEL: 9 CM/S
ABDOMINAL INFRARENAL AORTA PS VEL: 48 CM/S
ABDOMINAL INFRARENAL AORTA TRANS: 2.7 CM
ABDOMINAL JUXTARENAL AORTA AP: 3.2 CM
ABDOMINAL JUXTARENAL AORTA ED VEL: 12 CM/S
ABDOMINAL JUXTARENAL AORTA PS VEL: 51 CM/S
ABDOMINAL JUXTARENAL AORTA TRANS: 3.1 CM
ABDOMINAL LT COM ILIAC AP: 1.6 CM
ABDOMINAL LT COM ILIAC TRANS: 1.4 CM
ABDOMINAL LT COM ILIAC VEL: 365 CM/S
ABDOMINAL LT COM ILLIAC ED VEL: 24 CM/S
ABDOMINAL RT COM ILIAC AP: 1.3 CM
ABDOMINAL RT COM ILIAC TRANS: 1.3 CM
ABDOMINAL RT COM ILIAC VEL: 138 CM/S
ABDOMINAL RT COM ILLIAC ED VEL: 19 CM/S
ABDOMINAL SUPRARENAL AORTA AP: 3 CM
ABDOMINAL SUPRARENAL AORTA ED VEL: 17 CM/S
ABDOMINAL SUPRARENAL AORTA PS VEL: 58 CM/S
ABDOMINAL SUPRARENAL AORTA TRANS: 2.9 CM

## 2024-08-16 PROCEDURE — 76706 US ABDL AORTA SCREEN AAA: CPT | Mod: 26,,, | Performed by: INTERNAL MEDICINE

## 2024-08-16 PROCEDURE — 76706 US ABDL AORTA SCREEN AAA: CPT

## 2024-11-15 DIAGNOSIS — I48.21 PERMANENT ATRIAL FIBRILLATION: ICD-10-CM

## 2024-11-15 DIAGNOSIS — Z79.01 CHRONIC ANTICOAGULATION: ICD-10-CM

## 2024-11-15 RX ORDER — APIXABAN 5 MG/1
5 TABLET, FILM COATED ORAL 2 TIMES DAILY
Qty: 180 TABLET | Refills: 3 | Status: SHIPPED | OUTPATIENT
Start: 2024-11-15

## 2024-11-21 ENCOUNTER — TELEPHONE (OUTPATIENT)
Dept: CARDIOLOGY | Facility: CLINIC | Age: 84
End: 2024-11-21
Payer: MEDICARE

## 2024-11-21 NOTE — TELEPHONE ENCOUNTER
"----- Message from Vira sent at 11/21/2024 10:51 AM CST -----  Regarding: Refill  "Type:  Patient Call Back    Who Called:PT    What is the reqeust in detail:Requesting refill on her Lorazepam 1mg she takes it twice a day. Please advise    Can the clinic reply by [facility]?no    Best Call Back Number:[phone]      Additional Information:Pt lost medication and need new prescription sent to pharmacy     [id number] - [facility] [address] AT [facility] [address]  "

## 2024-12-03 DIAGNOSIS — E78.00 HYPERCHOLESTEROLEMIA: ICD-10-CM

## 2024-12-03 RX ORDER — EZETIMIBE 10 MG/1
10 TABLET ORAL
Qty: 90 TABLET | Refills: 3 | Status: SHIPPED | OUTPATIENT
Start: 2024-12-03

## 2024-12-18 DIAGNOSIS — I10 PRIMARY HYPERTENSION: ICD-10-CM

## 2024-12-18 DIAGNOSIS — I50.32 HEART FAILURE, DIASTOLIC, CHRONIC: ICD-10-CM

## 2024-12-18 RX ORDER — HYDROCHLOROTHIAZIDE 12.5 MG/1
12.5 CAPSULE ORAL DAILY
Qty: 90 CAPSULE | Refills: 3 | Status: SHIPPED | OUTPATIENT
Start: 2024-12-18

## 2024-12-18 NOTE — TELEPHONE ENCOUNTER
----- Message from Vira sent at 12/18/2024  1:12 PM CST -----  Regarding: refill  Type: RX Refill Request    Who Called:PT    RX Name and Strength:hydroCHLOROthiazide (MICROZIDE) 12.5 mg capsule    Preferred Pharmacy with phone number:[id number] [address] AT [facility] [address]        Would the patient rather a call back or a response via [facility]?call back     Best Call Back Number:[phone]    Additional Information:

## 2025-01-18 DIAGNOSIS — E78.00 HYPERCHOLESTEROLEMIA: ICD-10-CM

## 2025-01-20 RX ORDER — ATORVASTATIN CALCIUM 80 MG/1
80 TABLET, FILM COATED ORAL
Qty: 90 TABLET | Refills: 3 | Status: SHIPPED | OUTPATIENT
Start: 2025-01-20

## 2025-02-04 DIAGNOSIS — I10 PRIMARY HYPERTENSION: ICD-10-CM

## 2025-02-04 DIAGNOSIS — I50.32 HEART FAILURE, DIASTOLIC, CHRONIC: ICD-10-CM

## 2025-02-04 RX ORDER — SPIRONOLACTONE 25 MG/1
25 TABLET ORAL
Qty: 90 TABLET | Refills: 3 | Status: SHIPPED | OUTPATIENT
Start: 2025-02-04 | End: 2025-02-05 | Stop reason: SDUPTHER

## 2025-02-05 ENCOUNTER — OFFICE VISIT (OUTPATIENT)
Dept: CARDIOLOGY | Facility: CLINIC | Age: 85
End: 2025-02-05
Attending: INTERNAL MEDICINE
Payer: MEDICARE

## 2025-02-05 VITALS
DIASTOLIC BLOOD PRESSURE: 72 MMHG | BODY MASS INDEX: 35.09 KG/M2 | OXYGEN SATURATION: 95 % | WEIGHT: 231.56 LBS | SYSTOLIC BLOOD PRESSURE: 139 MMHG | HEIGHT: 68 IN | HEART RATE: 61 BPM

## 2025-02-05 DIAGNOSIS — I71.43 INFRARENAL ABDOMINAL AORTIC ANEURYSM (AAA) WITHOUT RUPTURE: ICD-10-CM

## 2025-02-05 DIAGNOSIS — Z87.898 HISTORY OF CHEST PAIN: ICD-10-CM

## 2025-02-05 DIAGNOSIS — Z85.3 HISTORY OF BREAST CANCER: ICD-10-CM

## 2025-02-05 DIAGNOSIS — I48.21 PERMANENT ATRIAL FIBRILLATION: ICD-10-CM

## 2025-02-05 DIAGNOSIS — E78.00 HYPERCHOLESTEROLEMIA: ICD-10-CM

## 2025-02-05 DIAGNOSIS — E66.9 MILD OBESITY: ICD-10-CM

## 2025-02-05 DIAGNOSIS — K21.9 GASTROESOPHAGEAL REFLUX DISEASE WITHOUT ESOPHAGITIS: ICD-10-CM

## 2025-02-05 DIAGNOSIS — I10 PRIMARY HYPERTENSION: ICD-10-CM

## 2025-02-05 DIAGNOSIS — Z79.01 CHRONIC ANTICOAGULATION: ICD-10-CM

## 2025-02-05 DIAGNOSIS — I50.32 HEART FAILURE, DIASTOLIC, CHRONIC: ICD-10-CM

## 2025-02-05 PROCEDURE — 3288F FALL RISK ASSESSMENT DOCD: CPT | Mod: CPTII,S$GLB,, | Performed by: INTERNAL MEDICINE

## 2025-02-05 PROCEDURE — 93005 ELECTROCARDIOGRAM TRACING: CPT

## 2025-02-05 PROCEDURE — 3075F SYST BP GE 130 - 139MM HG: CPT | Mod: CPTII,S$GLB,, | Performed by: INTERNAL MEDICINE

## 2025-02-05 PROCEDURE — 1160F RVW MEDS BY RX/DR IN RCRD: CPT | Mod: CPTII,S$GLB,, | Performed by: INTERNAL MEDICINE

## 2025-02-05 PROCEDURE — 1159F MED LIST DOCD IN RCRD: CPT | Mod: CPTII,S$GLB,, | Performed by: INTERNAL MEDICINE

## 2025-02-05 PROCEDURE — 1101F PT FALLS ASSESS-DOCD LE1/YR: CPT | Mod: CPTII,S$GLB,, | Performed by: INTERNAL MEDICINE

## 2025-02-05 PROCEDURE — 93000 ELECTROCARDIOGRAM COMPLETE: CPT | Mod: S$GLB,,, | Performed by: INTERNAL MEDICINE

## 2025-02-05 PROCEDURE — 99999 PR PBB SHADOW E&M-EST. PATIENT-LVL III: CPT | Mod: PBBFAC,,, | Performed by: INTERNAL MEDICINE

## 2025-02-05 PROCEDURE — 99214 OFFICE O/P EST MOD 30 MIN: CPT | Mod: 25,S$GLB,, | Performed by: INTERNAL MEDICINE

## 2025-02-05 PROCEDURE — 1126F AMNT PAIN NOTED NONE PRSNT: CPT | Mod: CPTII,S$GLB,, | Performed by: INTERNAL MEDICINE

## 2025-02-05 PROCEDURE — 3078F DIAST BP <80 MM HG: CPT | Mod: CPTII,S$GLB,, | Performed by: INTERNAL MEDICINE

## 2025-02-05 RX ORDER — ATORVASTATIN CALCIUM 80 MG/1
80 TABLET, FILM COATED ORAL DAILY
Qty: 90 TABLET | Refills: 3 | Status: SHIPPED | OUTPATIENT
Start: 2025-02-05

## 2025-02-05 RX ORDER — EZETIMIBE 10 MG/1
10 TABLET ORAL DAILY
Qty: 90 TABLET | Refills: 3 | Status: SHIPPED | OUTPATIENT
Start: 2025-02-05

## 2025-02-05 RX ORDER — METOPROLOL SUCCINATE 25 MG/1
25 TABLET, EXTENDED RELEASE ORAL 2 TIMES DAILY
Qty: 180 TABLET | Refills: 3 | Status: SHIPPED | OUTPATIENT
Start: 2025-02-05

## 2025-02-05 RX ORDER — SPIRONOLACTONE 25 MG/1
25 TABLET ORAL DAILY
Qty: 90 TABLET | Refills: 3 | Status: SHIPPED | OUTPATIENT
Start: 2025-02-05

## 2025-02-05 RX ORDER — HYDROCHLOROTHIAZIDE 12.5 MG/1
12.5 CAPSULE ORAL DAILY
Qty: 90 CAPSULE | Refills: 3 | Status: SHIPPED | OUTPATIENT
Start: 2025-02-05

## 2025-02-05 RX ORDER — VALSARTAN 160 MG/1
160 TABLET ORAL DAILY
Qty: 90 TABLET | Refills: 3 | Status: SHIPPED | OUTPATIENT
Start: 2025-02-05

## 2025-02-05 NOTE — PROGRESS NOTES
Subjective:     Shanice Grewal is a 84 y.o. female with hypertension and hypercholesterolemia. In 2019 she was told she may have prediabetes. She is mildly obese. She has hypertensive heart disease with moderate diastolic dysfunction. She had an episode of chest pain when shopping on 11/14/2013. An ambulance was called for and she was checked at her home. She was advised to follow up with her doctor as an outpatient. She underwent a stress echocardiogram in 11/2013 that was fine. She was diagnosed with esophageal reflux disease in 4/2014. After she began a proton pump inhibitor and sucralfate her epigastric pain resolved. In 5/2015 she was seen in the emergency room at [facility] for atypical chest pain. She underwent a stress echocardiogram in 6/2015 during which she was able to exercise for 6:00 minutes. There was no chest pain and the electrocardiogram was negative. The echocardiogram was negative as well except for a mildly sigmoid septum. The chest pain later resolved. In 5/2018 she had gynecological surgery. On 9/9/2019 she had onset of palpitations and was diagnosed with atrial fibrillation. On 9/10/2019 she underwent electrical cardioversion. She has a [id number] score of 6 ([id number]) and was begun on apixiban. In 11/2019 she was diagnosed with breast cancer of the right breast. She underwent right mastectomy on 12/5/2019. In 3/2022 she was bothered by an upper respiratory in infection and has had occasional wheezing. She used inhalers. On 8/5/2022 she was again noted to be in atrial fibrillation and it then appeared that the atrial fibrillation had become chronic. She continues to occasionally experience a warm feeling in the right calf that can come on at essentially any time and does not appear related to activities. No exertional chest pain but mild exertional dyspnea. No palpitations or weak spells. No bleeding. No issues with any of her prescribed medications. Feeling well  overall.      Congestive Heart Failure  Presents for follow-up visit. Pertinent negatives include no abdominal pain, chest pain, chest pressure, claudication, edema, fatigue, muscle weakness, near-syncope, nocturia, orthopnea, palpitations, paroxysmal nocturnal dyspnea, shortness of breath or unexpected weight change. The symptoms have been stable.   Chest Pain   This is a chronic problem. The current episode started more than 1 year ago. The problem has been resolved. Pertinent negatives include no abdominal pain, back pain, claudication, cough, diaphoresis, dizziness, exertional chest pressure, fever, headaches, hemoptysis, irregular heartbeat, leg pain, lower extremity edema, malaise/fatigue, nausea, near-syncope, numbness, orthopnea, palpitations, PND, shortness of breath, sputum production, syncope, vomiting or weakness. The pain is aggravated by nothing.   Her past medical history is significant for CHF, hyperlipidemia and hypertension.   Pertinent negatives for past medical history include no diabetes and no muscle weakness.   Atrial Fibrillation  Presents for follow-up visit. Symptoms include hypertension. Symptoms are negative for bradycardia, chest pain, dizziness, hypotension, palpitations, shortness of breath, syncope, tachycardia and weakness. The symptoms have been stable. Past medical history includes CHF and hyperlipidemia.   Hypertension  This is a chronic problem. The current episode started more than 1 year ago. The problem is unchanged. The problem is controlled (usually 120-130/70-80 mmHg at home). Pertinent negatives include no anxiety, blurred vision, chest pain, headaches, malaise/fatigue, neck pain, orthopnea, palpitations, peripheral edema, PND, shortness of breath or sweats. There is no history of chronic renal disease.   Hyperlipidemia  This is a chronic problem. The current episode started more than 1 year ago. The problem is controlled. Recent lipid tests were reviewed and are normal.  Exacerbating diseases include obesity. She has no history of chronic renal disease, diabetes, hypothyroidism, liver disease or nephrotic syndrome. Pertinent negatives include no chest pain, focal sensory loss, focal weakness, leg pain, myalgias or shortness of breath.       Review of Systems   Constitutional: Negative for diaphoresis, fatigue, fever, malaise/fatigue and unexpected weight change.   HENT:  Negative for nosebleeds.    Eyes:  Negative for blurred vision, pain, vision loss in left eye and vision loss in right eye.   Cardiovascular:  Positive for dyspnea on exertion. Negative for chest pain, claudication, irregular heartbeat, leg swelling, near-syncope, orthopnea, palpitations, paroxysmal nocturnal dyspnea and syncope.   Respiratory:  Negative for cough, hemoptysis, shortness of breath, sputum production and wheezing.    Endocrine: Negative for cold intolerance, heat intolerance and polydipsia.   Hematologic/Lymphatic: Negative for bleeding problem. Does not bruise/bleed easily.   Skin:  Negative for color change and rash.   Musculoskeletal:  Positive for arthritis. Negative for back pain, falls, joint pain, muscle weakness, myalgias and neck pain.   Gastrointestinal:  Negative for abdominal pain, heartburn, hematemesis, hematochezia, hemorrhoids, jaundice, melena, nausea and vomiting.   Genitourinary:  Negative for dysuria, hematuria and nocturia.   Neurological:  Negative for dizziness, focal weakness, headaches, light-headedness, loss of balance, numbness, tremors, vertigo and weakness.   Psychiatric/Behavioral:  Negative for altered mental status, depression and memory loss. The patient is not nervous/anxious.    Allergic/Immunologic: Negative for hives and persistent infections.       Current Outpatient Medications on File Prior to Visit   Medication Sig Dispense Refill    atorvastatin (LIPITOR) 80 MG tablet TAKE 1 TABLET(80 MG) BY MOUTH EVERY DAY 90 tablet 3    ELIQUIS 5 mg Tab TAKE 1 TABLET(5 MG)  "BY MOUTH TWICE DAILY 180 tablet 3    ergocalciferol (ERGOCALCIFEROL) 50,000 unit Cap Take 50,000 Units by mouth every 14 (fourteen) days.      ezetimibe (ZETIA) 10 mg tablet TAKE 1 TABLET(10 MG) BY MOUTH EVERY DAY 90 tablet 3    gabapentin (NEURONTIN) 100 MG capsule Take 100 mg by mouth 3 (three) times daily.      hydroCHLOROthiazide (MICROZIDE) 12.5 mg capsule Take 1 capsule (12.5 mg total) by mouth once daily. 90 capsule 3    LORazepam (ATIVAN) 1 MG tablet Take 1 mg by mouth every 12 (twelve) hours as needed for Anxiety.      metoprolol succinate (TOPROL-XL) 25 MG 24 hr tablet Take 1 tablet (25 mg total) by mouth 2 (two) times daily. 180 tablet 3    omeprazole (PRILOSEC) 20 MG capsule Take 20 mg by mouth once daily.      PATANOL 0.1 % ophthalmic solution       spironolactone (ALDACTONE) 25 MG tablet TAKE 1 TABLET(25 MG) BY MOUTH EVERY DAY 90 tablet 3    valsartan (DIOVAN) 160 MG tablet Take 1 tablet (160 mg total) by mouth once daily. 90 tablet 3    azelastine (ASTELIN) 137 mcg (0.1 %) nasal spray 1 spray by Nasal route 2 (two) times daily. (Patient not taking: Reported on 2/5/2025)      fluticasone (FLONASE) 50 mcg/actuation nasal spray  (Patient not taking: Reported on 2/5/2025)      FLUVIRIN [id number] 45 mcg (15 mcg x 3)/0.5 mL Susp  (Patient not taking: Reported on 2/5/2025)      metoclopramide HCl (REGLAN) 10 MG tablet  (Patient not taking: Reported on 2/5/2025)      sucralfate (CARAFATE) 1 gram tablet Take 1 g by mouth 4 (four) times daily before meals and nightly.  (Patient not taking: Reported on 2/5/2025)       No current facility-administered medications on file prior to visit.       /72   Pulse 61   Ht 5' 8" (1.727 m)   Wt 105 kg (231 lb 9.5 oz)   SpO2 95%   BMI 35.21 kg/m²     Objective:     Physical Exam  Constitutional:       General: She is not in acute distress.     Appearance: Normal appearance. She is well-developed. She is not ill-appearing or diaphoretic.   HENT:      Head: " Normocephalic and atraumatic.      Nose: Nose normal.   Eyes:      General:         Right eye: No discharge.         Left eye: No discharge.      Conjunctiva/sclera:      Right eye: No hemorrhage.     Left eye: No hemorrhage.     Pupils: Pupils are equal.      Right eye: Pupil is round.      Left eye: Pupil is round.   Neck:      Thyroid: No thyromegaly.      Vascular: No carotid bruit or JVD.   Cardiovascular:      Rate and Rhythm: Normal rate. Rhythm irregularly irregular. No extrasystoles are present.     Chest Wall: PMI is not displaced.      Pulses:           Radial pulses are 2+ on the right side and 2+ on the left side.        Dorsalis pedis pulses are 1+ on the right side and 1+ on the left side.        Posterior tibial pulses are 1+ on the right side and 1+ on the left side.      Heart sounds: S1 normal and S2 normal. Murmur heard.      Harsh midsystolic murmur is present with a grade of 2/6 at the upper right sternal border.   Pulmonary:      Effort: Pulmonary effort is normal.      Breath sounds: Normal breath sounds.   Abdominal:      Palpations: Abdomen is soft.      Tenderness: There is no abdominal tenderness.   Musculoskeletal:      Cervical back: Neck supple.      Right ankle: No swelling, deformity or ecchymosis.      Left ankle: No swelling, deformity or ecchymosis.   Lymphadenopathy:      Head:      Right side of head: No submandibular adenopathy.      Left side of head: No submandibular adenopathy.      Cervical: No cervical adenopathy.   Skin:     General: Skin is warm.      Findings: No rash.   Neurological:      General: No focal deficit present.      Mental Status: She is alert and oriented to person, place, and time. She is not disoriented.      Cranial Nerves: No cranial nerve deficit.   Psychiatric:         Attention and Perception: Attention and perception normal.         Mood and Affect: Mood and affect normal.         Speech: Speech normal.         Behavior: Behavior normal.          Thought Content: Thought content normal.         Cognition and Memory: Cognition and memory normal.         Judgment: Judgment normal.         Assessment:      1. Heart failure, diastolic, chronic    2. History of chest pain    3. Permanent atrial fibrillation    4. Chronic anticoagulation    5. Infrarenal abdominal aortic aneurysm (AAA) without rupture    6. Primary hypertension    7. Hypercholesterolemia    8. Mild obesity    9. Gastroesophageal reflux disease without esophagitis    10. History of breast cancer        Plan:     1. Heart Failure, Diastolic, Chronic   2/21/2011: Echo: Moderate left ventricular hypertrophy. Moderate diastolic dysfunction. Mild to moderate mitral regurgitation.   4/19/2023: Lisinopril 10 mg Q24 was changed to valsartan 160 mg Q24 due to cough.   On metoprolol 25 mg Q12, valsartan 160 mg Q24, spironolactone 25 mg Q24 and hctz 12.5 mg Q24.   Well compensated.    2. History of Chest Pain   11/15/2013: ECG: NSR. Left axis deviation.   11/25/2013: Stress Echo: 6:30 min. Negative.   5/19/2015: Seen at [facility] ER for atypical chest pain.   6/2/2015: Stress Echo: 6:00 min. No CP. ECG negative. Echo negative. Mildly sigmoid septum.   3/15/2016: Recurrent atypical chest pain.   Appeared chest pain was non cardiac in origin.   Resolved.   Reassurance.    3. Atrial Fibrillation   9/9/2019: Diagnosed with paroxysmal atrial fibrillation.   [id number] ([id number]).   9/10/2019: CV.   8/5/2022: In atrial fibrillation - appeared atrial fibrillation had become chronic.   Decided on a rate control strategy.   On apixiban.   On metoprolol 25 mg Q12.   Rate appears well controlled.     4. Chronic Anticoagulation   9/9/2019: Diagnosed with paroxysmal atrial fibrillation.   [id number] ([id number]).   On apixiban 5 mg Q12.   No aspirin or NSAID.   No bleeding.    5. Abdominal Aortic Aneurysm   1/24/2018: CT abdomen: Small AAA 3.1 cm.   1/2/2019: U/S AAA: Small AAA 2.5 cm x 3.0 cm.   6/22/2020: U/S AAA: Small  AAA: 3.0 cm x 3.1 cm.   7/6/2021: U/S AAA: Small AAA: 2.8 x 2.9 cm. LCIA: Severe stenosis: 3.5 m/s.   7/6/2022: U/S AAA: Small AAA: 2.6 x 3.2 cm. RCIA: Severe stenosis - 2.7 m/s. LCIA: Severe stenosis - 3.5 m/s.   8/10/2023: U/S AAA: Ectatic AA: 2.7 x 2.8 cm. RCIA: Severe stenosis - 2.7 m/s. LCIA: Severe stenosis - 3.4 m/s.   8/16/2024: U/S AAA: Small AAA: 3.2 cm. RCIA: Mild stenosis - 1.4 m/s. LCIA: Severe stenosis - 3.7 m/s.   She has no claudication - therefore no intervention.   Blood pressure is well controlled.   8/2025: Plan next U/S AAA. Then yearly.     6. Hypertension   2000: Diagnosed.   4/19/2023: Lisinopril 10 mg Q24 was changed to valsartan 160 mg Q24 due to cough.   On metoprolol 25 mg Q12, valsartan 160 mg Q24, spironolactone 25 mg Q24 and hctz 12.5 mg Q24.   Keeping log at home.    Well controlled.    7. Hypercholesterolemia   2005: Began statin.   On atorvastatin 80 mg Q24 and ezetimide 10 mg Q24.   1/18/2022: Chol 130. HDL 37. LDL 80. TG 58.   On atorvastatin 80 mg Q24 and ezetimide 10 mg Q24.   Favorable lipid panel.     8. Mild Obesity   4/10/2017: Weight 98 kg. BMI 32.   10/23/2020: Weight 106 kg. BMI 36.   2/26/2021: Weight 107 kg. BMI 36.   3/21/2022: Weight 109 kg. BMI 37.   6/30/2023: Weight 101 kg. BMI 34.   Encouraged to lose weight.    9. Gastroesophageal Reflux Disease    4/2014: Diagnosed.   On omeprazole 20 mg Q24.   Seldom bothers her.    10. History of Breast Cancer   11/2019: Diagnosed. Right breast.   12/5/2019: Right mastectomy.   Dr. Kennedy John, III..   Dr. Erich Albrecht.    11. History of Bronchitis   3/2022: Had URI.   Received inhalers.   Dr. Dru Nam.     12. Primary Care   Dr. Magdiel Deutsch.    F/u 6 months.    Mira Mcknight M.D.      2/5/2025 3:53 PM, Addendum:        I discussed the above test result and the implications of the findings over the phone.    Mira Mcknight M.D.

## 2025-02-07 LAB
OHS QRS DURATION: 98 MS
OHS QTC CALCULATION: 447 MS

## 2025-07-10 ENCOUNTER — TELEPHONE (OUTPATIENT)
Dept: CARDIOLOGY | Facility: CLINIC | Age: 85
End: 2025-07-10
Payer: MEDICARE

## 2025-07-10 NOTE — TELEPHONE ENCOUNTER
Pt notified and verbalized understanding to hold Eliquis  for 2 day before surgery and one day after.         Pt scheduled for cataract surgery on 7/18/25, pt wants to know if she should stop Eliquis for a few days.    Please advise      Copied from CRM #[id number]. Topic: General Inquiry - Patient Advice  >> Jul 10, 2025  9:31 AM Vale wrote:  Type:  Needs Medical Advice    Who Called: Pt  Symptoms (please be specific): Medication clearance  Would the patient rather a call back or a response via [facility]? Call  Best Call Back Number: [phone]   Additional Information:  Pt would like to speak with nurse or provider in office regarding upcomming procedure and if she should stop her medication (apixaban (ELIQUIS) 5 mg Tab).

## 2025-08-13 ENCOUNTER — OFFICE VISIT (OUTPATIENT)
Dept: CARDIOLOGY | Facility: CLINIC | Age: 85
End: 2025-08-13
Attending: INTERNAL MEDICINE
Payer: MEDICARE

## 2025-08-13 VITALS
WEIGHT: 225 LBS | SYSTOLIC BLOOD PRESSURE: 120 MMHG | OXYGEN SATURATION: 95 % | HEART RATE: 68 BPM | HEIGHT: 68 IN | DIASTOLIC BLOOD PRESSURE: 64 MMHG | BODY MASS INDEX: 34.1 KG/M2

## 2025-08-13 DIAGNOSIS — N18.31 TYPE 2 DIABETES MELLITUS WITH STAGE 3A CHRONIC KIDNEY DISEASE, WITHOUT LONG-TERM CURRENT USE OF INSULIN: ICD-10-CM

## 2025-08-13 DIAGNOSIS — E66.9 MILD OBESITY: ICD-10-CM

## 2025-08-13 DIAGNOSIS — I48.21 PERMANENT ATRIAL FIBRILLATION: ICD-10-CM

## 2025-08-13 DIAGNOSIS — E78.00 HYPERCHOLESTEROLEMIA: ICD-10-CM

## 2025-08-13 DIAGNOSIS — I71.43 INFRARENAL ABDOMINAL AORTIC ANEURYSM (AAA) WITHOUT RUPTURE: ICD-10-CM

## 2025-08-13 DIAGNOSIS — I50.32 HEART FAILURE, DIASTOLIC, CHRONIC: ICD-10-CM

## 2025-08-13 DIAGNOSIS — I10 PRIMARY HYPERTENSION: ICD-10-CM

## 2025-08-13 DIAGNOSIS — Z85.3 HISTORY OF BREAST CANCER: ICD-10-CM

## 2025-08-13 DIAGNOSIS — Z87.898 HISTORY OF CHEST PAIN: ICD-10-CM

## 2025-08-13 DIAGNOSIS — K21.9 GASTROESOPHAGEAL REFLUX DISEASE WITHOUT ESOPHAGITIS: ICD-10-CM

## 2025-08-13 DIAGNOSIS — Z79.01 CHRONIC ANTICOAGULATION: ICD-10-CM

## 2025-08-13 DIAGNOSIS — E11.22 TYPE 2 DIABETES MELLITUS WITH STAGE 3A CHRONIC KIDNEY DISEASE, WITHOUT LONG-TERM CURRENT USE OF INSULIN: ICD-10-CM

## 2025-08-13 DIAGNOSIS — N18.31 STAGE 3A CHRONIC KIDNEY DISEASE: ICD-10-CM

## 2025-08-13 PROBLEM — E11.9 DIABETES MELLITUS, TYPE 2: Status: ACTIVE | Noted: 2025-08-13

## 2025-08-13 PROBLEM — N18.30 CHRONIC KIDNEY DISEASE, STAGE III (MODERATE): Status: ACTIVE | Noted: 2025-08-13

## 2025-08-13 PROCEDURE — 1159F MED LIST DOCD IN RCRD: CPT | Mod: CPTII,S$GLB,, | Performed by: INTERNAL MEDICINE

## 2025-08-13 PROCEDURE — 1101F PT FALLS ASSESS-DOCD LE1/YR: CPT | Mod: CPTII,S$GLB,, | Performed by: INTERNAL MEDICINE

## 2025-08-13 PROCEDURE — 1160F RVW MEDS BY RX/DR IN RCRD: CPT | Mod: CPTII,S$GLB,, | Performed by: INTERNAL MEDICINE

## 2025-08-13 PROCEDURE — 3288F FALL RISK ASSESSMENT DOCD: CPT | Mod: CPTII,S$GLB,, | Performed by: INTERNAL MEDICINE

## 2025-08-13 PROCEDURE — 3074F SYST BP LT 130 MM HG: CPT | Mod: CPTII,S$GLB,, | Performed by: INTERNAL MEDICINE

## 2025-08-13 PROCEDURE — 99215 OFFICE O/P EST HI 40 MIN: CPT | Mod: S$GLB,,, | Performed by: INTERNAL MEDICINE

## 2025-08-13 PROCEDURE — 1126F AMNT PAIN NOTED NONE PRSNT: CPT | Mod: CPTII,S$GLB,, | Performed by: INTERNAL MEDICINE

## 2025-08-13 PROCEDURE — 3078F DIAST BP <80 MM HG: CPT | Mod: CPTII,S$GLB,, | Performed by: INTERNAL MEDICINE

## 2025-08-13 PROCEDURE — 99999 PR PBB SHADOW E&M-EST. PATIENT-LVL III: CPT | Mod: PBBFAC,,, | Performed by: INTERNAL MEDICINE

## 2025-08-13 RX ORDER — HYDROCHLOROTHIAZIDE 12.5 MG/1
12.5 CAPSULE ORAL DAILY
Qty: 90 CAPSULE | Refills: 3 | Status: SHIPPED | OUTPATIENT
Start: 2025-08-13

## 2025-08-13 RX ORDER — EZETIMIBE 10 MG/1
10 TABLET ORAL DAILY
Qty: 90 TABLET | Refills: 3 | Status: SHIPPED | OUTPATIENT
Start: 2025-08-13

## 2025-08-13 RX ORDER — METOPROLOL SUCCINATE 25 MG/1
25 TABLET, EXTENDED RELEASE ORAL 2 TIMES DAILY
Qty: 180 TABLET | Refills: 3 | Status: SHIPPED | OUTPATIENT
Start: 2025-08-13

## 2025-08-13 RX ORDER — VALSARTAN 160 MG/1
160 TABLET ORAL DAILY
Qty: 90 TABLET | Refills: 3 | Status: SHIPPED | OUTPATIENT
Start: 2025-08-13

## 2025-08-13 RX ORDER — SPIRONOLACTONE 25 MG/1
25 TABLET ORAL DAILY
Qty: 90 TABLET | Refills: 3 | Status: SHIPPED | OUTPATIENT
Start: 2025-08-13

## 2025-08-13 RX ORDER — ATORVASTATIN CALCIUM 80 MG/1
80 TABLET, FILM COATED ORAL DAILY
Qty: 90 TABLET | Refills: 3 | Status: SHIPPED | OUTPATIENT
Start: 2025-08-13

## 2025-08-20 ENCOUNTER — HOSPITAL ENCOUNTER (OUTPATIENT)
Dept: CARDIOLOGY | Facility: OTHER | Age: 85
Discharge: HOME | End: 2025-08-20
Attending: INTERNAL MEDICINE
Payer: MEDICARE

## 2025-08-20 VITALS — HEART RATE: 88 BPM | DIASTOLIC BLOOD PRESSURE: 64 MMHG | SYSTOLIC BLOOD PRESSURE: 120 MMHG

## 2025-08-20 DIAGNOSIS — I50.32 HEART FAILURE, DIASTOLIC, CHRONIC: ICD-10-CM

## 2025-08-20 DIAGNOSIS — I71.43 INFRARENAL ABDOMINAL AORTIC ANEURYSM (AAA) WITHOUT RUPTURE: ICD-10-CM

## 2025-08-20 PROBLEM — I34.0 MITRAL REGURGITATION: Status: ACTIVE | Noted: 2025-08-20

## 2025-08-20 PROBLEM — I73.9 PERIPHERAL ARTERY DISEASE: Status: ACTIVE | Noted: 2025-08-20

## 2025-08-20 LAB
ABDOMINAL INFRARENAL AORTA AP: 3.3 CM
ABDOMINAL INFRARENAL AORTA ED VEL: 11 CM/S
ABDOMINAL INFRARENAL AORTA PS VEL: 47 CM/S
ABDOMINAL INFRARENAL AORTA TRANS: 3.6 CM
ABDOMINAL JUXTARENAL AORTA AP: 3.3 CM
ABDOMINAL JUXTARENAL AORTA ED VEL: 22 CM/S
ABDOMINAL JUXTARENAL AORTA PS VEL: 59 CM/S
ABDOMINAL JUXTARENAL AORTA TRANS: 3.4 CM
ABDOMINAL LT COM ILIAC AP: 2 CM
ABDOMINAL LT COM ILIAC TRANS: 2.2 CM
ABDOMINAL LT COM ILIAC VEL: 308 CM/S
ABDOMINAL LT COM ILLIAC ED VEL: 25 CM/S
ABDOMINAL RT COM ILIAC AP: 1.7 CM
ABDOMINAL RT COM ILIAC TRANS: 2 CM
ABDOMINAL RT COM ILIAC VEL: 121 CM/S
ABDOMINAL RT COM ILLIAC ED VEL: 17 CM/S
ABDOMINAL SUPRARENAL AORTA AP: 2.3 CM
ABDOMINAL SUPRARENAL AORTA ED VEL: 22 CM/S
ABDOMINAL SUPRARENAL AORTA PS VEL: 103 CM/S
ABDOMINAL SUPRARENAL AORTA TRANS: 2.4 CM
ASCENDING AORTA: 2.8 CM
AV INDEX (PROSTH): 0.67
AV MEAN GRADIENT: 4 MMHG
AV PEAK GRADIENT: 7 MMHG
AV VALVE AREA BY VELOCITY RATIO: 2.1 CM²
AV VALVE AREA: 2.3 CM²
AV VELOCITY RATIO: 0.62
CV ECHO LV RWT: 0.39 CM
DOP CALC AO PEAK VEL: 1.3 M/S
DOP CALC AO VTI: 21.9 CM
DOP CALC LVOT AREA: 3.5 CM2
DOP CALC LVOT DIAMETER: 2.1 CM
DOP CALC LVOT PEAK VEL: 0.8 M/S
DOP CALC MV VTI: 34.9 CM
DOP CALC RVOT PEAK VEL: 0.6 M/S
DOP CALCLVOT PEAK VEL VTI: 14.7 CM
ECHO LV POSTERIOR WALL: 1 CM (ref 0.6–1.1)
EJECTION FRACTION: 60 %
FRACTIONAL SHORTENING: 31.4 % (ref 28–44)
INTERVENTRICULAR SEPTUM: 1 CM (ref 0.6–1.1)
IVC DIAMETER: 2.16 CM
LA MAJOR: 7.5 CM
LA MINOR: 7.1 CM
LA WIDTH: 5.1 CM
LEFT ATRIUM AREA SYSTOLIC (APICAL 2 CHAMBER): 36.76 CM2
LEFT ATRIUM AREA SYSTOLIC (APICAL 4 CHAMBER): 37.37 CM2
LEFT ATRIUM SIZE: 4.9 CM
LEFT ATRIUM VOLUME MOD: 144 ML
LEFT ATRIUM VOLUME: 155 CM3
LEFT INTERNAL DIMENSION IN SYSTOLE: 3.5 CM (ref 2.1–4)
LEFT VENTRICLE DIASTOLIC VOLUME: 123 ML
LEFT VENTRICLE END SYSTOLIC VOLUME APICAL 2 CHAMBER: 148.4 ML
LEFT VENTRICLE END SYSTOLIC VOLUME APICAL 4 CHAMBER: 135.79 ML
LEFT VENTRICLE SYSTOLIC VOLUME: 49 ML
LEFT VENTRICULAR INTERNAL DIMENSION IN DIASTOLE: 5.1 CM (ref 3.5–6)
LEFT VENTRICULAR MASS: 188 G
LVED V (TEICH): 123.46 ML
LVES V (TEICH): 49.04 ML
LVOT MG: 1.11 MMHG
LVOT MV: 0.48 CM/S
MV PEAK GRADIENT: 11 MMHG
MV VALVE AREA BY CONTINUITY EQUATION: 1.46 CM2
OHS CV AAA LARGEST SIZE: 3.6 CM
OHS CV RV/LV RATIO: 1.2 CM
PISA MRMAX VEL: 6.27 M/S
PISA TR MAX VEL: 2.9 M/S
PV PEAK GRADIENT: 4 MMHG
PV PEAK VELOCITY: 0.99 M/S
RA MAJOR: 6.64 CM
RA PRESSURE ESTIMATED: 3 MMHG
RA WIDTH: 5.3 CM
RIGHT VENTRICLE DIASTOLIC BASEL DIMENSION: 6.1 CM
RV TB RVSP: 6 MMHG
SINUS: 3 CM
STJ: 2.8 CM
TR MAX PG: 34 MMHG
TRICUSPID ANNULAR PLANE SYSTOLIC EXCURSION: 1.9 CM
TV REST PULMONARY ARTERY PRESSURE: 37 MMHG

## 2025-08-20 PROCEDURE — 76706 US ABDL AORTA SCREEN AAA: CPT | Mod: 26,,, | Performed by: INTERNAL MEDICINE

## 2025-08-20 PROCEDURE — 76706 US ABDL AORTA SCREEN AAA: CPT

## 2025-08-20 PROCEDURE — 93306 TTE W/DOPPLER COMPLETE: CPT | Mod: 26,,, | Performed by: INTERNAL MEDICINE

## 2025-08-20 PROCEDURE — 93306 TTE W/DOPPLER COMPLETE: CPT

## 2025-08-22 ENCOUNTER — TELEPHONE (OUTPATIENT)
Dept: CARDIOLOGY | Facility: CLINIC | Age: 85
End: 2025-08-22
Payer: MEDICARE

## 2025-08-25 ENCOUNTER — TELEPHONE (OUTPATIENT)
Dept: CARDIOLOGY | Facility: CLINIC | Age: 85
End: 2025-08-25
Payer: MEDICARE